# Patient Record
Sex: FEMALE | Race: WHITE
[De-identification: names, ages, dates, MRNs, and addresses within clinical notes are randomized per-mention and may not be internally consistent; named-entity substitution may affect disease eponyms.]

---

## 2020-10-15 ENCOUNTER — HOSPITAL ENCOUNTER (OUTPATIENT)
Dept: HOSPITAL 8 - RAD | Age: 40
Discharge: HOME | End: 2020-10-15
Attending: FAMILY MEDICINE
Payer: SELF-PAY

## 2020-10-15 DIAGNOSIS — N88.8: Primary | ICD-10-CM

## 2020-10-15 PROCEDURE — 76856 US EXAM PELVIC COMPLETE: CPT

## 2021-12-21 ENCOUNTER — OFFICE VISIT (OUTPATIENT)
Dept: URGENT CARE | Facility: CLINIC | Age: 41
End: 2021-12-21
Payer: MEDICAID

## 2021-12-21 VITALS
HEART RATE: 88 BPM | HEIGHT: 66 IN | BODY MASS INDEX: 26.36 KG/M2 | SYSTOLIC BLOOD PRESSURE: 118 MMHG | RESPIRATION RATE: 16 BRPM | WEIGHT: 164 LBS | DIASTOLIC BLOOD PRESSURE: 84 MMHG | TEMPERATURE: 98 F | OXYGEN SATURATION: 98 %

## 2021-12-21 DIAGNOSIS — L30.9 ECZEMA, UNSPECIFIED TYPE: ICD-10-CM

## 2021-12-21 PROCEDURE — 99213 OFFICE O/P EST LOW 20 MIN: CPT | Performed by: STUDENT IN AN ORGANIZED HEALTH CARE EDUCATION/TRAINING PROGRAM

## 2021-12-21 RX ORDER — METHYLPREDNISOLONE 4 MG/1
TABLET ORAL
Qty: 21 TABLET | Refills: 0 | Status: SHIPPED | OUTPATIENT
Start: 2021-12-21 | End: 2022-03-13

## 2021-12-21 RX ORDER — BETAMETHASONE DIPROPIONATE 0.5 MG/G
0.5 CREAM TOPICAL 2 TIMES DAILY
Qty: 45 G | Refills: 1 | Status: SHIPPED | OUTPATIENT
Start: 2021-12-21 | End: 2022-01-04

## 2021-12-21 RX ORDER — DEXAMETHASONE SODIUM PHOSPHATE 10 MG/ML
10 INJECTION INTRAMUSCULAR; INTRAVENOUS ONCE
Status: COMPLETED | OUTPATIENT
Start: 2021-12-21 | End: 2021-12-21

## 2021-12-21 RX ADMIN — DEXAMETHASONE SODIUM PHOSPHATE 10 MG: 10 INJECTION INTRAMUSCULAR; INTRAVENOUS at 09:46

## 2021-12-21 NOTE — PROGRESS NOTES
"Subjective     Sharita Jordan is a 41 y.o. female who presents with Eczema (flare up for the past month located on face and underarms)            Patient is a very agreeable 41-year-old  individual with a significant history of eczema in the past patient recently moved to the area has been trying to establish with dermatology however has been unsuccessful.  Presently patient endorses rash on face in addition to axillary areas.  Otherwise denies subjective fevers rigors chills denies any nausea vomiting.  Patient tried hydrocortisone in the past with some relief although now to stop working.      Review of Systems   Skin: Positive for rash.   All other systems reviewed and are negative.             Objective     /84 (BP Location: Left arm, Patient Position: Sitting, BP Cuff Size: Adult long)   Pulse 88   Temp 36.7 °C (98 °F) (Temporal)   Resp 16   Ht 1.676 m (5' 6\")   Wt 74.4 kg (164 lb)   SpO2 98%   Breastfeeding No   BMI 26.47 kg/m²      Physical Exam  Skin:     Findings: Rash present.      Comments: Rash involving cheeks in addition axillary areas no evidence of maculopapular areas                             Assessment & Plan        1. Eczema, unspecified type  Patient with history of eczema presents clinic for further management.  Plan:  1.  Decadron 10 mg IM x1 dose  2.  Solu-Medrol Dosepak to be taken as prescribed  3.  Betamethasone as prescribed below to be taken as prescribed below  4.  Referral to dermatology.    Patient was counseled that should his symptoms not improve or suddenly worsen to contact urgent care for further medication of adjustment.  Patient endorses understanding.  - dexamethasone (DECADRON) injection (check route below) 10 mg  - methylPREDNISolone (MEDROL DOSEPAK) 4 MG Tablet Therapy Pack; Follow schedule on package instructions.  Dispense: 21 Tablet; Refill: 0  - betamethasone dipropionate 0.05 % Cream; Apply 0.5 cm topically 2 times a day for 14 days.  " Dispense: 45 g; Refill: 1  - Referral to Dermatology

## 2022-03-13 ENCOUNTER — OFFICE VISIT (OUTPATIENT)
Dept: URGENT CARE | Facility: CLINIC | Age: 42
End: 2022-03-13
Payer: MEDICAID

## 2022-03-13 VITALS
WEIGHT: 161 LBS | DIASTOLIC BLOOD PRESSURE: 82 MMHG | BODY MASS INDEX: 25.88 KG/M2 | SYSTOLIC BLOOD PRESSURE: 120 MMHG | OXYGEN SATURATION: 99 % | TEMPERATURE: 99.9 F | HEART RATE: 100 BPM | HEIGHT: 66 IN | RESPIRATION RATE: 16 BRPM

## 2022-03-13 DIAGNOSIS — R21 RASH AND NONSPECIFIC SKIN ERUPTION: ICD-10-CM

## 2022-03-13 DIAGNOSIS — L30.9 DERMATITIS: ICD-10-CM

## 2022-03-13 DIAGNOSIS — Z87.2 HISTORY OF ECZEMA: ICD-10-CM

## 2022-03-13 PROCEDURE — 99214 OFFICE O/P EST MOD 30 MIN: CPT | Performed by: NURSE PRACTITIONER

## 2022-03-13 RX ORDER — ESCITALOPRAM OXALATE 10 MG/1
TABLET ORAL
COMMUNITY
Start: 2022-02-02 | End: 2022-08-12

## 2022-03-13 RX ORDER — HYDROXYZINE HYDROCHLORIDE 25 MG/1
25 TABLET, FILM COATED ORAL EVERY 6 HOURS PRN
Qty: 30 TABLET | Refills: 0 | Status: SHIPPED | OUTPATIENT
Start: 2022-03-13 | End: 2022-08-12

## 2022-03-13 RX ORDER — PREDNISONE 20 MG/1
TABLET ORAL
Qty: 15 TABLET | Refills: 0 | Status: SHIPPED | OUTPATIENT
Start: 2022-03-13 | End: 2022-08-12

## 2022-03-13 RX ORDER — CRISABOROLE 20 MG/G
OINTMENT TOPICAL
COMMUNITY
Start: 2022-03-02 | End: 2022-08-12

## 2022-03-13 RX ORDER — TRIAMCINOLONE ACETONIDE 0.25 MG/ML
LOTION TOPICAL
COMMUNITY
Start: 2022-02-14 | End: 2022-08-12

## 2022-03-13 RX ORDER — PROPRANOLOL HYDROCHLORIDE 20 MG/1
20 TABLET ORAL 2 TIMES DAILY PRN
COMMUNITY
Start: 2022-02-02 | End: 2023-03-07

## 2022-03-13 ASSESSMENT — ENCOUNTER SYMPTOMS
CONSTITUTIONAL NEGATIVE: 1
SHORTNESS OF BREATH: 0

## 2022-03-13 ASSESSMENT — VISUAL ACUITY: OU: 1

## 2022-03-13 NOTE — PROGRESS NOTES
Subjective:     Sharita Jordan is a 41 y.o. female who presents for Allergic Reaction (Face started with blistering, now swollen and cracking, now getting rashes on rest of body, possibly from new medications, she stopped them on friday)       Rash  This is a new problem. The problem has been gradually worsening since onset. Pertinent negatives include no shortness of breath.     Patient seen in urgent care 12/21/2021.  Was having flareup of eczema on face and underarms.  Was started on Medrol Dosepak, betamethasone cream, and given Decadron IM.  Referred to dermatology.    Patient reports that 2 weeks ago, she started to develop worsening rash.  2 weeks prior to the onset of her rash, she started new medications including Lexapro and propanolol.  These were started for anxiety.  Patient has since stopped the medication suspecting possible allergy.  Has history of eczema.  Has used triamcinolone lotion for her face where her symptoms usually occur.  Recently was prescribed Eucrisa, has not started this yet.  Reports rash is itchy and it burns.  Affects mainly the face, but now is having symptoms on her arms.  Has been scratching.  Denies other exposure.  Has an appointment with dermatology in May.  Tried Benadryl with no improvement in the symptoms.  Takes loratadine once a day.  No shortness of breath.    Patient was screened prior to rooming and denied COVID-19 diagnosis or contact with a person who has been diagnosed or is suspected to have COVID-19. During this visit, appropriate PPE was worn, hand hygiene was performed, and the patient and any visitors were masked.     PMH:  has no past medical history on file.    MEDS:   Current Outpatient Medications:   •  predniSONE (DELTASONE) 20 MG Tab, Take 3 tabs daily x 3 days, then 2 tabs daily x 2 days, then 1 tab x 2 days., Disp: 15 Tablet, Rfl: 0  •  hydrOXYzine HCl (ATARAX) 25 MG Tab, Take 1 Tablet by mouth every 6 hours as needed for Itching., Disp: 30 Tablet,  "Rfl: 0  •  escitalopram (LEXAPRO) 10 MG Tab, PLEASE SEE ATTACHED FOR DETAILED DIRECTIONS (Patient not taking: Reported on 3/13/2022), Disp: , Rfl:   •  propranolol (INDERAL) 20 MG Tab, Take 20 mg by mouth 2 times a day as needed. (Patient not taking: Reported on 3/13/2022), Disp: , Rfl:     ALLERGIES:   Allergies   Allergen Reactions   • Azithromycin Rash and Itching     Pt stated   • Cyclorex [Cyclandelate] Rash and Itching     Pt stated     SURGHX: History reviewed. No pertinent surgical history.    SOCHX:  reports that she has been smoking cigarettes. She has never used smokeless tobacco. She reports current alcohol use. She reports that she does not use drugs.     FH: Reviewed with patient, not pertinent to this visit.    Review of Systems   Constitutional: Negative.    Respiratory: Negative for shortness of breath.    Skin: Positive for itching and rash.   All other systems reviewed and are negative.    Additional details per HPI.      Objective:     /82   Pulse 100   Temp 37.7 °C (99.9 °F) (Temporal)   Resp 16   Ht 1.676 m (5' 6\")   Wt 73 kg (161 lb)   SpO2 99%   BMI 25.99 kg/m²     Physical Exam  Vitals reviewed.   Constitutional:       General: She is not in acute distress.     Appearance: She is well-developed. She is not ill-appearing or toxic-appearing.   Eyes:      General: Vision grossly intact.      Extraocular Movements: Extraocular movements intact.   Cardiovascular:      Rate and Rhythm: Normal rate.   Pulmonary:      Effort: Pulmonary effort is normal. No respiratory distress.   Musculoskeletal:         General: No deformity. Normal range of motion.      Cervical back: Normal range of motion.      Comments: Erythema, inflammation, dryness, scaliness of skin of face, smaller similar lesions on arms   Skin:     General: Skin is warm and dry.      Coloration: Skin is not pale.      Findings: Rash present.   Neurological:      Mental Status: She is alert and oriented to person, place, and " time.      Sensory: No sensory deficit.      Motor: No weakness.   Psychiatric:         Behavior: Behavior normal. Behavior is cooperative.       Assessment/Plan:     1. Rash and nonspecific skin eruption  - predniSONE (DELTASONE) 20 MG Tab; Take 3 tabs daily x 3 days, then 2 tabs daily x 2 days, then 1 tab x 2 days.  Dispense: 15 Tablet; Refill: 0  - hydrOXYzine HCl (ATARAX) 25 MG Tab; Take 1 Tablet by mouth every 6 hours as needed for Itching.  Dispense: 30 Tablet; Refill: 0    2. Dermatitis  - predniSONE (DELTASONE) 20 MG Tab; Take 3 tabs daily x 3 days, then 2 tabs daily x 2 days, then 1 tab x 2 days.  Dispense: 15 Tablet; Refill: 0  - hydrOXYzine HCl (ATARAX) 25 MG Tab; Take 1 Tablet by mouth every 6 hours as needed for Itching.  Dispense: 30 Tablet; Refill: 0    3. History of eczema  - predniSONE (DELTASONE) 20 MG Tab; Take 3 tabs daily x 3 days, then 2 tabs daily x 2 days, then 1 tab x 2 days.  Dispense: 15 Tablet; Refill: 0  - hydrOXYzine HCl (ATARAX) 25 MG Tab; Take 1 Tablet by mouth every 6 hours as needed for Itching.  Dispense: 30 Tablet; Refill: 0    Other orders  - escitalopram (LEXAPRO) 10 MG Tab; PLEASE SEE ATTACHED FOR DETAILED DIRECTIONS (Patient not taking: Reported on 3/13/2022)  - propranolol (INDERAL) 20 MG Tab; Take 20 mg by mouth 2 times a day as needed. (Patient not taking: Reported on 3/13/2022)    Rx as above sent electronically.    Continue facial creams as previously directed.  May use OTC colloidal oatmeal baths as needed.  Rx for prednisone for symptom relief.  Avoid NSAIDs while on steroid therapy.  Rx sent for Atarax.  Stop Benadryl.     Follow-up with dermatology in May.    Warning signs reviewed. Return precautions discussed.     Differential diagnosis, natural history, supportive care, over-the-counter symptom management per 's instructions, close monitoring, and indications for immediate follow-up discussed.     All questions answered. Patient agrees with the plan of  care.    Billing note: chronic illness with exacerbation/progression; prescription drug management

## 2022-08-12 ENCOUNTER — OFFICE VISIT (OUTPATIENT)
Dept: URGENT CARE | Facility: CLINIC | Age: 42
End: 2022-08-12
Payer: MEDICAID

## 2022-08-12 ENCOUNTER — APPOINTMENT (OUTPATIENT)
Dept: RADIOLOGY | Facility: MEDICAL CENTER | Age: 42
End: 2022-08-12
Attending: EMERGENCY MEDICINE
Payer: MEDICAID

## 2022-08-12 ENCOUNTER — HOSPITAL ENCOUNTER (EMERGENCY)
Facility: MEDICAL CENTER | Age: 42
End: 2022-08-12
Attending: EMERGENCY MEDICINE
Payer: MEDICAID

## 2022-08-12 VITALS
OXYGEN SATURATION: 93 % | DIASTOLIC BLOOD PRESSURE: 80 MMHG | WEIGHT: 167.6 LBS | BODY MASS INDEX: 26.93 KG/M2 | HEIGHT: 66 IN | RESPIRATION RATE: 16 BRPM | TEMPERATURE: 97.9 F | SYSTOLIC BLOOD PRESSURE: 122 MMHG | HEART RATE: 81 BPM

## 2022-08-12 VITALS
SYSTOLIC BLOOD PRESSURE: 152 MMHG | DIASTOLIC BLOOD PRESSURE: 89 MMHG | HEART RATE: 59 BPM | WEIGHT: 167.99 LBS | TEMPERATURE: 98 F | BODY MASS INDEX: 27 KG/M2 | RESPIRATION RATE: 16 BRPM | HEIGHT: 66 IN | OXYGEN SATURATION: 100 %

## 2022-08-12 DIAGNOSIS — R10.32 LLQ ABDOMINAL PAIN: ICD-10-CM

## 2022-08-12 DIAGNOSIS — K57.92 DIVERTICULITIS: ICD-10-CM

## 2022-08-12 LAB
ALBUMIN SERPL BCP-MCNC: 4.8 G/DL (ref 3.2–4.9)
ALBUMIN/GLOB SERPL: 1.6 G/DL
ALP SERPL-CCNC: 47 U/L (ref 30–99)
ALT SERPL-CCNC: 54 U/L (ref 2–50)
ANION GAP SERPL CALC-SCNC: 14 MMOL/L (ref 7–16)
APPEARANCE UR: CLEAR
AST SERPL-CCNC: 23 U/L (ref 12–45)
BASOPHILS # BLD AUTO: 1 % (ref 0–1.8)
BASOPHILS # BLD: 0.11 K/UL (ref 0–0.12)
BILIRUB SERPL-MCNC: 0.2 MG/DL (ref 0.1–1.5)
BILIRUB UR QL STRIP.AUTO: NEGATIVE
BUN SERPL-MCNC: 6 MG/DL (ref 8–22)
CALCIUM SERPL-MCNC: 9.8 MG/DL (ref 8.5–10.5)
CHLORIDE SERPL-SCNC: 105 MMOL/L (ref 96–112)
CO2 SERPL-SCNC: 20 MMOL/L (ref 20–33)
COLOR UR: YELLOW
CREAT SERPL-MCNC: 0.76 MG/DL (ref 0.5–1.4)
EOSINOPHIL # BLD AUTO: 0.19 K/UL (ref 0–0.51)
EOSINOPHIL NFR BLD: 1.7 % (ref 0–6.9)
ERYTHROCYTE [DISTWIDTH] IN BLOOD BY AUTOMATED COUNT: 39.7 FL (ref 35.9–50)
GFR SERPLBLD CREATININE-BSD FMLA CKD-EPI: 101 ML/MIN/1.73 M 2
GLOBULIN SER CALC-MCNC: 3 G/DL (ref 1.9–3.5)
GLUCOSE SERPL-MCNC: 86 MG/DL (ref 65–99)
GLUCOSE UR STRIP.AUTO-MCNC: NEGATIVE MG/DL
HCG SERPL QL: NEGATIVE
HCT VFR BLD AUTO: 39.6 % (ref 37–47)
HGB BLD-MCNC: 13.8 G/DL (ref 12–16)
IMM GRANULOCYTES # BLD AUTO: 0.03 K/UL (ref 0–0.11)
IMM GRANULOCYTES NFR BLD AUTO: 0.3 % (ref 0–0.9)
KETONES UR STRIP.AUTO-MCNC: NEGATIVE MG/DL
LEUKOCYTE ESTERASE UR QL STRIP.AUTO: NEGATIVE
LIPASE SERPL-CCNC: 41 U/L (ref 11–82)
LYMPHOCYTES # BLD AUTO: 2.26 K/UL (ref 1–4.8)
LYMPHOCYTES NFR BLD: 19.8 % (ref 22–41)
MCH RBC QN AUTO: 32.3 PG (ref 27–33)
MCHC RBC AUTO-ENTMCNC: 34.8 G/DL (ref 33.6–35)
MCV RBC AUTO: 92.7 FL (ref 81.4–97.8)
MICRO URNS: NORMAL
MONOCYTES # BLD AUTO: 0.84 K/UL (ref 0–0.85)
MONOCYTES NFR BLD AUTO: 7.4 % (ref 0–13.4)
NEUTROPHILS # BLD AUTO: 7.99 K/UL (ref 2–7.15)
NEUTROPHILS NFR BLD: 69.8 % (ref 44–72)
NITRITE UR QL STRIP.AUTO: NEGATIVE
NRBC # BLD AUTO: 0 K/UL
NRBC BLD-RTO: 0 /100 WBC
PH UR STRIP.AUTO: 7.5 [PH] (ref 5–8)
PLATELET # BLD AUTO: 297 K/UL (ref 164–446)
PMV BLD AUTO: 10.5 FL (ref 9–12.9)
POTASSIUM SERPL-SCNC: 3.9 MMOL/L (ref 3.6–5.5)
PROT SERPL-MCNC: 7.8 G/DL (ref 6–8.2)
PROT UR QL STRIP: NEGATIVE MG/DL
RBC # BLD AUTO: 4.27 M/UL (ref 4.2–5.4)
RBC UR QL AUTO: NEGATIVE
SODIUM SERPL-SCNC: 139 MMOL/L (ref 135–145)
SP GR UR STRIP.AUTO: 1
UROBILINOGEN UR STRIP.AUTO-MCNC: 0.2 MG/DL
WBC # BLD AUTO: 11.4 K/UL (ref 4.8–10.8)

## 2022-08-12 PROCEDURE — 36415 COLL VENOUS BLD VENIPUNCTURE: CPT

## 2022-08-12 PROCEDURE — 80053 COMPREHEN METABOLIC PANEL: CPT

## 2022-08-12 PROCEDURE — 96376 TX/PRO/DX INJ SAME DRUG ADON: CPT

## 2022-08-12 PROCEDURE — 94760 N-INVAS EAR/PLS OXIMETRY 1: CPT

## 2022-08-12 PROCEDURE — 700117 HCHG RX CONTRAST REV CODE 255: Performed by: EMERGENCY MEDICINE

## 2022-08-12 PROCEDURE — 700102 HCHG RX REV CODE 250 W/ 637 OVERRIDE(OP): Performed by: EMERGENCY MEDICINE

## 2022-08-12 PROCEDURE — A9270 NON-COVERED ITEM OR SERVICE: HCPCS | Performed by: EMERGENCY MEDICINE

## 2022-08-12 PROCEDURE — 96374 THER/PROPH/DIAG INJ IV PUSH: CPT

## 2022-08-12 PROCEDURE — 85025 COMPLETE CBC W/AUTO DIFF WBC: CPT

## 2022-08-12 PROCEDURE — 81003 URINALYSIS AUTO W/O SCOPE: CPT

## 2022-08-12 PROCEDURE — 83690 ASSAY OF LIPASE: CPT

## 2022-08-12 PROCEDURE — 84703 CHORIONIC GONADOTROPIN ASSAY: CPT

## 2022-08-12 PROCEDURE — 700111 HCHG RX REV CODE 636 W/ 250 OVERRIDE (IP): Performed by: EMERGENCY MEDICINE

## 2022-08-12 PROCEDURE — 74177 CT ABD & PELVIS W/CONTRAST: CPT

## 2022-08-12 PROCEDURE — 99285 EMERGENCY DEPT VISIT HI MDM: CPT

## 2022-08-12 PROCEDURE — 99215 OFFICE O/P EST HI 40 MIN: CPT | Performed by: PHYSICIAN ASSISTANT

## 2022-08-12 PROCEDURE — 96375 TX/PRO/DX INJ NEW DRUG ADDON: CPT

## 2022-08-12 RX ORDER — PROPRANOLOL HYDROCHLORIDE 10 MG/1
TABLET ORAL
COMMUNITY
Start: 2022-07-29

## 2022-08-12 RX ORDER — OXYCODONE HYDROCHLORIDE 5 MG/1
5 TABLET ORAL EVERY 4 HOURS PRN
Qty: 18 TABLET | Refills: 0 | Status: SHIPPED | OUTPATIENT
Start: 2022-08-12 | End: 2022-08-15

## 2022-08-12 RX ORDER — TRAZODONE HYDROCHLORIDE 50 MG/1
TABLET ORAL
COMMUNITY
Start: 2022-07-29 | End: 2023-03-07

## 2022-08-12 RX ORDER — OXYCODONE HYDROCHLORIDE 5 MG/1
5 TABLET ORAL EVERY 4 HOURS PRN
Qty: 18 TABLET | Refills: 0 | Status: SHIPPED | OUTPATIENT
Start: 2022-08-12 | End: 2022-08-12 | Stop reason: SDUPTHER

## 2022-08-12 RX ORDER — ONDANSETRON 4 MG/1
4 TABLET, ORALLY DISINTEGRATING ORAL EVERY 8 HOURS PRN
Qty: 20 TABLET | Refills: 0 | Status: SHIPPED | OUTPATIENT
Start: 2022-08-12 | End: 2023-03-07

## 2022-08-12 RX ORDER — HYDROMORPHONE HYDROCHLORIDE 1 MG/ML
0.5 INJECTION, SOLUTION INTRAMUSCULAR; INTRAVENOUS; SUBCUTANEOUS
Status: DISCONTINUED | OUTPATIENT
Start: 2022-08-12 | End: 2022-08-12 | Stop reason: HOSPADM

## 2022-08-12 RX ORDER — ONDANSETRON 4 MG/1
4 TABLET, ORALLY DISINTEGRATING ORAL EVERY 8 HOURS PRN
Qty: 20 TABLET | Refills: 0 | Status: SHIPPED | OUTPATIENT
Start: 2022-08-12 | End: 2022-08-12 | Stop reason: SDUPTHER

## 2022-08-12 RX ORDER — AMOXICILLIN AND CLAVULANATE POTASSIUM 875; 125 MG/1; MG/1
1 TABLET, FILM COATED ORAL ONCE
Status: COMPLETED | OUTPATIENT
Start: 2022-08-12 | End: 2022-08-12

## 2022-08-12 RX ORDER — BUPROPION HYDROCHLORIDE 150 MG/1
TABLET ORAL
COMMUNITY
Start: 2022-08-05

## 2022-08-12 RX ORDER — OXYCODONE HYDROCHLORIDE 5 MG/1
5 TABLET ORAL ONCE
Status: COMPLETED | OUTPATIENT
Start: 2022-08-12 | End: 2022-08-12

## 2022-08-12 RX ORDER — AMOXICILLIN AND CLAVULANATE POTASSIUM 875; 125 MG/1; MG/1
1 TABLET, FILM COATED ORAL 2 TIMES DAILY
Qty: 14 TABLET | Refills: 0 | Status: SHIPPED | OUTPATIENT
Start: 2022-08-12 | End: 2022-08-12 | Stop reason: SDUPTHER

## 2022-08-12 RX ORDER — ONDANSETRON 2 MG/ML
4 INJECTION INTRAMUSCULAR; INTRAVENOUS ONCE
Status: COMPLETED | OUTPATIENT
Start: 2022-08-12 | End: 2022-08-12

## 2022-08-12 RX ORDER — AMOXICILLIN AND CLAVULANATE POTASSIUM 875; 125 MG/1; MG/1
1 TABLET, FILM COATED ORAL 2 TIMES DAILY
Qty: 14 TABLET | Refills: 0 | Status: SHIPPED | OUTPATIENT
Start: 2022-08-12 | End: 2022-08-19

## 2022-08-12 RX ADMIN — HYDROMORPHONE HYDROCHLORIDE 0.5 MG: 1 INJECTION, SOLUTION INTRAMUSCULAR; INTRAVENOUS; SUBCUTANEOUS at 18:35

## 2022-08-12 RX ADMIN — OXYCODONE 5 MG: 5 TABLET ORAL at 18:57

## 2022-08-12 RX ADMIN — IOHEXOL 90 ML: 350 INJECTION, SOLUTION INTRAVENOUS at 18:45

## 2022-08-12 RX ADMIN — HYDROMORPHONE HYDROCHLORIDE 0.5 MG: 1 INJECTION, SOLUTION INTRAMUSCULAR; INTRAVENOUS; SUBCUTANEOUS at 17:25

## 2022-08-12 RX ADMIN — ONDANSETRON 4 MG: 2 INJECTION INTRAMUSCULAR; INTRAVENOUS at 17:25

## 2022-08-12 RX ADMIN — AMOXICILLIN AND CLAVULANATE POTASSIUM 1 TABLET: 875; 125 TABLET, FILM COATED ORAL at 18:52

## 2022-08-12 ASSESSMENT — ENCOUNTER SYMPTOMS
RESPIRATORY NEGATIVE: 1
BLOOD IN STOOL: 0
CONSTITUTIONAL NEGATIVE: 1
NEUROLOGICAL NEGATIVE: 1
CARDIOVASCULAR NEGATIVE: 1
EYES NEGATIVE: 1
ABDOMINAL PAIN: 1
CONSTIPATION: 0
NAUSEA: 0
DIARRHEA: 0
VOMITING: 0

## 2022-08-12 ASSESSMENT — PAIN DESCRIPTION - PAIN TYPE: TYPE: ACUTE PAIN

## 2022-08-12 NOTE — PROGRESS NOTES
"  Subjective:     Sharita Jordan  is a 42 y.o. female who presents for Abdominal Pain (Lower left side x 4 days )       She presents today with left lower quadrant pain x3-4 days but did get significantly worse over the last 24 hours.  She has subjective fever and notes full body \"shakiness\".  No history of trauma to the abdomen.  She denies chest pain, shortness of breath, nausea/vomiting, diarrhea.  No changes in bowel movements, she did have 3 bowel movements today that were normal and appearance.  No blood in the stool.  No vaginal pain, vaginal bleeding, vaginal discharge.  She does still have her ovaries and uterus.  No recent illnesses.      Review of Systems   Constitutional: Negative.    HENT: Negative.     Eyes: Negative.    Respiratory: Negative.     Cardiovascular: Negative.    Gastrointestinal:  Positive for abdominal pain (Severe left lower quadrant pain). Negative for blood in stool, constipation, diarrhea, nausea and vomiting.   Genitourinary: Negative.    Neurological: Negative.     Allergies   Allergen Reactions    Cefaclor Hives    Erythromycin Hives    Erythromycin-Sulfisoxazole Hives    Azithromycin Rash and Itching     Pt stated    Cyclorex [Cyclandelate] Rash and Itching     Pt stated     No past medical history on file.     Objective:   /80   Pulse 81   Temp 36.6 °C (97.9 °F) (Temporal)   Resp 16   Ht 1.676 m (5' 6\")   Wt 76 kg (167 lb 9.6 oz)   SpO2 93%   BMI 27.05 kg/m²   Physical Exam  Vitals and nursing note reviewed.   Constitutional:       General: She is not in acute distress.     Appearance: She is not ill-appearing or toxic-appearing.   HENT:      Head: Normocephalic.      Nose: No rhinorrhea.   Eyes:      General: No scleral icterus.     Conjunctiva/sclera: Conjunctivae normal.   Cardiovascular:      Rate and Rhythm: Normal rate and regular rhythm.   Pulmonary:      Effort: Pulmonary effort is normal. No respiratory distress.      Breath sounds: No stridor. "   Abdominal:      General: Abdomen is flat. Bowel sounds are normal.      Tenderness: There is abdominal tenderness (Severe tenderness to light palpation over the left lower quadrant). There is guarding.   Musculoskeletal:      Cervical back: Neck supple.   Neurological:      Mental Status: She is alert and oriented to person, place, and time.   Psychiatric:         Mood and Affect: Mood normal.         Behavior: Behavior normal.         Thought Content: Thought content normal.         Judgment: Judgment normal.           Diagnostic testing: None    Assessment/Plan:     Encounter Diagnoses   Name Primary?    LLQ abdominal pain           Plan for care for today's complaint includes transfer to [facility] emergency department for further evaluation and management of her current symptoms.  At this time we are unable to order an outpatient CT scan within a reasonable amount of time based on her symptom presentation and symptom severity.  Transfer service was contacted.    See AVS Instructions below for written guidance provided to patient on after-visit management and care in addition to our verbal discussion during the visit.    Please note that this dictation was created using voice recognition software. I have attempted to correct all errors, but there may be sound-alike, spelling, grammar and possibly content errors that I did not discover before finalizing the note.    Jan Santos PA-C

## 2022-08-12 NOTE — ED TRIAGE NOTES
"Chief Complaint   Patient presents with    Abdominal Pain     LLQ pain radiating to L flank. Pt went to urgent care and was sent here. Pain started Sunday but worsening today. Nausea but no vomiting. Denies diarrhea. Denies dysuria.     BP (!) 125/91   Pulse 76   Temp 37.2 °C (99 °F) (Temporal)   Resp 14   Ht 1.676 m (5' 6\")   Wt 76.2 kg (167 lb 15.9 oz)   SpO2 98%   BMI 27.11 kg/m²     "

## 2022-08-13 NOTE — ED PROVIDER NOTES
ED Provider Note    Scribed for Jalen Ludwig M.D. by Gaurav Huggins. 8/12/2022  5:06 PM    Primary care provider: Pcp Pt States None  Means of arrival: Walk-In  History obtained from: Patient  History limited by: None     CHIEF COMPLAINT  Chief Complaint   Patient presents with    Abdominal Pain     LLQ pain radiating to L flank. Pt went to urgent care and was sent here. Pain started Sunday but worsening today. Nausea but no vomiting. Denies diarrhea. Denies dysuria.       HPI  Sharita Jordan is a 42 y.o. female who presents to the Emergency Department for worsening left lower quadrant and mid abdominal pain onset 4 days ago. Patient reports a history of ovarian cysts. She denies a history of abdominal surgeries or abdominal pain. Patient reports her last period was 60 days ago. She reports having mid abdominal pain for 4 days which radiated to her left lower quadrant this morning prompting her to initially present to Urgent Care who referred the patient to the ED. Patient reports associated nausea, mid abdominal pain, pain radiation to her back. There are no alleviating or exacerbating factors. She denies associated fever, vomiting, dysuria, hematuria, blood in stool, decreased bowel movements. Patient denies being pregnant.      REVIEW OF SYSTEMS  Pertinent positives include left lower quadrant abdominal pain, mid abdominal pain, nausea, mid abdominal pain, pain radiation to her back. Pertinent negatives include no fever, vomiting, dysuria, hematuria, blood in stool, decreased bowel movements.  All other systems reviewed and negative.    PAST MEDICAL HISTORY       SURGICAL HISTORY  patient denies any surgical history    SOCIAL HISTORY  Social History     Tobacco Use    Smoking status: Every Day     Types: Cigarettes    Smokeless tobacco: Never   Substance Use Topics    Alcohol use: Yes     Comment: Social    Drug use: Never      Social History     Substance and Sexual Activity   Drug Use Never       FAMILY  "HISTORY  History reviewed. No pertinent family history.    CURRENT MEDICATIONS  Home Medications       Reviewed by Martínez Ayala R.N. (Registered Nurse) on 08/12/22 at 1926  Med List Status: Partial     Medication Last Dose Status   buPROPion (WELLBUTRIN XL) 150 MG XL tablet  Active   propranolol (INDERAL) 10 MG Tab  Active   propranolol (INDERAL) 20 MG Tab  Active   sertraline (ZOLOFT) 50 MG Tab  Active   traZODone (DESYREL) 50 MG Tab  Active                    ALLERGIES  Allergies   Allergen Reactions    Cefaclor Hives    Erythromycin Hives    Erythromycin-Sulfisoxazole Hives    Azithromycin Rash and Itching     Pt stated    Cyclorex [Cyclandelate] Rash and Itching     Pt stated       PHYSICAL EXAM  VITAL SIGNS: BP (!) 125/91   Pulse 76   Temp 37.2 °C (99 °F) (Temporal)   Resp 14   Ht 1.676 m (5' 6\")   Wt 76.2 kg (167 lb 15.9 oz)   SpO2 98%   BMI 27.11 kg/m²     Constitutional: Well developed, Well nourished, moderate distress, Non-toxic appearance.   HENT: Normocephalic, Atraumatic, Bilateral external ears normal, Oropharynx moist, No oral exudates.   Eyes: PERRLA, EOMI, Conjunctiva normal, No discharge.   Neck: No tenderness, Supple, No stridor.   Lymphatic: No lymphadenopathy noted.   Cardiovascular: Normal heart rate, Normal rhythm.   Thorax & Lungs: Clear to auscultation bilaterally, No respiratory distress, No wheezing, No crackles.   Abdomen: Soft, tenderness to palpation on the left side, more pronounced on the left lower quadrant, No masses, No pulsatile masses.   Skin: Warm, Dry, No erythema, No rash.   Extremities:, No edema No cyanosis.   Musculoskeletal: No tenderness to palpation or major deformities noted.  Intact distal pulses  Neurologic: Awake, alert. Moves all extremities spontaneously.  Psychiatric: Affect normal, Judgment normal, Mood normal.     LABS  Results for orders placed or performed during the hospital encounter of 08/12/22   CBC WITH DIFFERENTIAL   Result Value Ref Range    " WBC 11.4 (H) 4.8 - 10.8 K/uL    RBC 4.27 4.20 - 5.40 M/uL    Hemoglobin 13.8 12.0 - 16.0 g/dL    Hematocrit 39.6 37.0 - 47.0 %    MCV 92.7 81.4 - 97.8 fL    MCH 32.3 27.0 - 33.0 pg    MCHC 34.8 33.6 - 35.0 g/dL    RDW 39.7 35.9 - 50.0 fL    Platelet Count 297 164 - 446 K/uL    MPV 10.5 9.0 - 12.9 fL    Neutrophils-Polys 69.80 44.00 - 72.00 %    Lymphocytes 19.80 (L) 22.00 - 41.00 %    Monocytes 7.40 0.00 - 13.40 %    Eosinophils 1.70 0.00 - 6.90 %    Basophils 1.00 0.00 - 1.80 %    Immature Granulocytes 0.30 0.00 - 0.90 %    Nucleated RBC 0.00 /100 WBC    Neutrophils (Absolute) 7.99 (H) 2.00 - 7.15 K/uL    Lymphs (Absolute) 2.26 1.00 - 4.80 K/uL    Monos (Absolute) 0.84 0.00 - 0.85 K/uL    Eos (Absolute) 0.19 0.00 - 0.51 K/uL    Baso (Absolute) 0.11 0.00 - 0.12 K/uL    Immature Granulocytes (abs) 0.03 0.00 - 0.11 K/uL    NRBC (Absolute) 0.00 K/uL   COMP METABOLIC PANEL   Result Value Ref Range    Sodium 139 135 - 145 mmol/L    Potassium 3.9 3.6 - 5.5 mmol/L    Chloride 105 96 - 112 mmol/L    Co2 20 20 - 33 mmol/L    Anion Gap 14.0 7.0 - 16.0    Glucose 86 65 - 99 mg/dL    Bun 6 (L) 8 - 22 mg/dL    Creatinine 0.76 0.50 - 1.40 mg/dL    Calcium 9.8 8.5 - 10.5 mg/dL    AST(SGOT) 23 12 - 45 U/L    ALT(SGPT) 54 (H) 2 - 50 U/L    Alkaline Phosphatase 47 30 - 99 U/L    Total Bilirubin 0.2 0.1 - 1.5 mg/dL    Albumin 4.8 3.2 - 4.9 g/dL    Total Protein 7.8 6.0 - 8.2 g/dL    Globulin 3.0 1.9 - 3.5 g/dL    A-G Ratio 1.6 g/dL   LIPASE   Result Value Ref Range    Lipase 41 11 - 82 U/L   HCG QUAL SERUM   Result Value Ref Range    Beta-Hcg Qualitative Serum Negative Negative   URINALYSIS,CULTURE IF INDICATED    Specimen: Urine   Result Value Ref Range    Color Yellow     Character Clear     Specific Gravity 1.003 <1.035    Ph 7.5 5.0 - 8.0    Glucose Negative Negative mg/dL    Ketones Negative Negative mg/dL    Protein Negative Negative mg/dL    Bilirubin Negative Negative    Urobilinogen, Urine 0.2 Negative    Nitrite Negative  Negative    Leukocyte Esterase Negative Negative    Occult Blood Negative Negative    Micro Urine Req see below    ESTIMATED GFR   Result Value Ref Range    GFR (CKD-EPI) 101 >60 mL/min/1.73 m 2        RADIOLOGY  CT-ABDOMEN-PELVIS WITH   Final Result      1.  There are findings consistent with mild descending colon diverticulitis.   2.  There is mild hepatic steatosis.        The radiologist's interpretation of all radiological studies have been reviewed by me.      COURSE & MEDICAL DECISION MAKING  Pertinent Labs & Imaging studies reviewed. (See chart for details)    I reviewed the patient's medical records which showed she was sent by Urgent Care today.     5:06 PM - Patient seen and examined at bedside. Patient will be treated with Dilaudid 0.5 mg, Zofran 4 mg. Ordered UA Culture if indicated, HCG Qual Serum, Lipase, CMP, CBC w/diff, Estimated GFR, CT-Abdomen Pelvis with to evaluate her symptoms. The differential diagnoses include but are not limited to: kidney stone vs diverticulitis vs intra abdominal infection. Discussed plan of care with patient. I informed them that labs and imaging will be ordered to evaluate symptoms. Patient is understanding and agreeable with plan.      6:46 PM - Patient treated with Augmentin 875-125 mg.     6:47 PM - Patient was reevaluated at bedside. They are afebrile, patient has some asymptomatic hypertension, vitals otherwise normal. Informed patient of finding of diverticulitis. Further informed patient of plan to provide medication prescription for pain. Advised patient to follow up with their primary care physician and GI in an outpatient setting. I then informed the patient of my plan for discharge, which includes strict return precautions for any new or worsening symptoms. She understands and verbalizes agreement to plan of care. They were given an opportunity to ask questions. She is comfortable going home at this time.      6:49 PM - Patient treated with Roxicodone 5  mg    Decision Making:  Patient with moderate severe left-sided flank pain/abdominal pain of uncertain etiology.  The patient has significant tenderness on the left side of the abdomen, CT scan shows mild diverticulitis.  Will discharge patient home on Augmentin for 7 days, give the patient a prescription for some pain medicines, Zofran, have the patient return in the next 1 to 2 days if not improved, return sooner with worsening symptoms.    I reviewed prescription monitoring program for patient's narcotic use before prescribing a scheduled drug.The patient will not drink alcohol nor drive with prescribed medications. The patient will return for new or worsening symptoms and is stable at the time of discharge.    The patient is referred to a primary physician for blood pressure management, diabetic screening, and for all other preventative health concerns.    In prescribing controlled substances to this patient, I certify that I have obtained and reviewed the medical history of Sharita Jordan. I have also made a good jt effort to obtain applicable records from other providers who have treated the patient and records did not demonstrate any increased risk of substance abuse that would prevent me from prescribing controlled substances.     I have conducted a physical exam and documented it. I have reviewed Ms. Jordan’s prescription history as maintained by the [place] Prescription Monitoring Program.     I have assessed the patient’s risk for abuse, dependency, and addiction using the validated Opioid Risk Tool available at https://www.mdcalc.com/xifbhf-vtky-nlmm-ort-narcotic-abuse.     Given the above, I believe the benefits of controlled substance therapy outweigh the risks. The reasons for prescribing controlled substances include non-narcotic, oral analgesic alternatives have been inadequate for pain control. Accordingly, I have discussed the risk and benefits, treatment plan, and alternative therapies with the  patient.      DISPOSITION:  Patient will be discharged home in stable condition.    FOLLOW UP:  No follow-up provider specified.    OUTPATIENT MEDICATIONS:  Discharge Medication List as of 8/12/2022  7:38 PM      Augmentin, Percocet, Zofran    FINAL IMPRESSION  1. Diverticulitis          Gaurav JC (Scribe), am scribing for, and in the presence of, Jalen Ludwig M.D..    Electronically signed by: Gaurav Huggins (Scribe), 8/12/2022    Jalen JC M.D. personally performed the services described in this documentation, as scribed by Gaurav Huggins in my presence, and it is both accurate and complete.    The note accurately reflects work and decisions made by me.  Jalen Ludwig M.D.  8/12/2022  11:28 PM

## 2022-12-16 ENCOUNTER — HOSPITAL ENCOUNTER (OUTPATIENT)
Dept: RADIOLOGY | Facility: MEDICAL CENTER | Age: 42
End: 2022-12-16
Attending: NURSE PRACTITIONER
Payer: MEDICAID

## 2022-12-16 ENCOUNTER — OFFICE VISIT (OUTPATIENT)
Dept: URGENT CARE | Facility: CLINIC | Age: 42
End: 2022-12-16
Payer: MEDICAID

## 2022-12-16 ENCOUNTER — HOSPITAL ENCOUNTER (OUTPATIENT)
Dept: LAB | Facility: MEDICAL CENTER | Age: 42
End: 2022-12-16
Attending: NURSE PRACTITIONER
Payer: MEDICAID

## 2022-12-16 VITALS
WEIGHT: 164 LBS | OXYGEN SATURATION: 97 % | TEMPERATURE: 97.8 F | HEIGHT: 66 IN | RESPIRATION RATE: 16 BRPM | DIASTOLIC BLOOD PRESSURE: 60 MMHG | BODY MASS INDEX: 26.36 KG/M2 | SYSTOLIC BLOOD PRESSURE: 110 MMHG | HEART RATE: 93 BPM

## 2022-12-16 DIAGNOSIS — R10.31 RLQ ABDOMINAL PAIN: ICD-10-CM

## 2022-12-16 DIAGNOSIS — K63.89 EPIPLOIC APPENDAGITIS: ICD-10-CM

## 2022-12-16 LAB
ALBUMIN SERPL BCP-MCNC: 4.9 G/DL (ref 3.2–4.9)
ALBUMIN/GLOB SERPL: 1.9 G/DL
ALP SERPL-CCNC: 43 U/L (ref 30–99)
ALT SERPL-CCNC: 31 U/L (ref 2–50)
ANION GAP SERPL CALC-SCNC: 10 MMOL/L (ref 7–16)
AST SERPL-CCNC: 13 U/L (ref 12–45)
BASOPHILS # BLD AUTO: 0.4 % (ref 0–1.8)
BASOPHILS # BLD: 0.06 K/UL (ref 0–0.12)
BILIRUB SERPL-MCNC: 0.4 MG/DL (ref 0.1–1.5)
BUN SERPL-MCNC: 6 MG/DL (ref 8–22)
CALCIUM ALBUM COR SERPL-MCNC: 8.8 MG/DL (ref 8.5–10.5)
CALCIUM SERPL-MCNC: 9.5 MG/DL (ref 8.5–10.5)
CHLORIDE SERPL-SCNC: 98 MMOL/L (ref 96–112)
CO2 SERPL-SCNC: 27 MMOL/L (ref 20–33)
CREAT SERPL-MCNC: 0.66 MG/DL (ref 0.5–1.4)
EOSINOPHIL # BLD AUTO: 0.07 K/UL (ref 0–0.51)
EOSINOPHIL NFR BLD: 0.5 % (ref 0–6.9)
ERYTHROCYTE [DISTWIDTH] IN BLOOD BY AUTOMATED COUNT: 42.1 FL (ref 35.9–50)
GFR SERPLBLD CREATININE-BSD FMLA CKD-EPI: 113 ML/MIN/1.73 M 2
GLOBULIN SER CALC-MCNC: 2.6 G/DL (ref 1.9–3.5)
GLUCOSE SERPL-MCNC: 93 MG/DL (ref 65–99)
HCT VFR BLD AUTO: 41.1 % (ref 37–47)
HGB BLD-MCNC: 13.7 G/DL (ref 12–16)
IMM GRANULOCYTES # BLD AUTO: 0.05 K/UL (ref 0–0.11)
IMM GRANULOCYTES NFR BLD AUTO: 0.4 % (ref 0–0.9)
LYMPHOCYTES # BLD AUTO: 2.19 K/UL (ref 1–4.8)
LYMPHOCYTES NFR BLD: 15.9 % (ref 22–41)
MCH RBC QN AUTO: 30.6 PG (ref 27–33)
MCHC RBC AUTO-ENTMCNC: 33.3 G/DL (ref 33.6–35)
MCV RBC AUTO: 91.7 FL (ref 81.4–97.8)
MONOCYTES # BLD AUTO: 0.77 K/UL (ref 0–0.85)
MONOCYTES NFR BLD AUTO: 5.6 % (ref 0–13.4)
NEUTROPHILS # BLD AUTO: 10.6 K/UL (ref 2–7.15)
NEUTROPHILS NFR BLD: 77.2 % (ref 44–72)
NRBC # BLD AUTO: 0 K/UL
NRBC BLD-RTO: 0 /100 WBC
PLATELET # BLD AUTO: 240 K/UL (ref 164–446)
PMV BLD AUTO: 10.3 FL (ref 9–12.9)
POTASSIUM SERPL-SCNC: 3.8 MMOL/L (ref 3.6–5.5)
PROT SERPL-MCNC: 7.5 G/DL (ref 6–8.2)
RBC # BLD AUTO: 4.48 M/UL (ref 4.2–5.4)
SODIUM SERPL-SCNC: 135 MMOL/L (ref 135–145)
WBC # BLD AUTO: 13.7 K/UL (ref 4.8–10.8)

## 2022-12-16 PROCEDURE — 99214 OFFICE O/P EST MOD 30 MIN: CPT | Performed by: NURSE PRACTITIONER

## 2022-12-16 PROCEDURE — 74177 CT ABD & PELVIS W/CONTRAST: CPT

## 2022-12-16 PROCEDURE — 700117 HCHG RX CONTRAST REV CODE 255: Performed by: NURSE PRACTITIONER

## 2022-12-16 PROCEDURE — 36415 COLL VENOUS BLD VENIPUNCTURE: CPT

## 2022-12-16 PROCEDURE — 80053 COMPREHEN METABOLIC PANEL: CPT

## 2022-12-16 PROCEDURE — 85025 COMPLETE CBC W/AUTO DIFF WBC: CPT

## 2022-12-16 RX ORDER — ONDANSETRON 4 MG/1
4 TABLET, ORALLY DISINTEGRATING ORAL ONCE
Status: COMPLETED | OUTPATIENT
Start: 2022-12-16 | End: 2022-12-16

## 2022-12-16 RX ORDER — ONDANSETRON 4 MG/1
4 TABLET, ORALLY DISINTEGRATING ORAL EVERY 6 HOURS PRN
Qty: 15 TABLET | Refills: 0 | Status: SHIPPED | OUTPATIENT
Start: 2022-12-16 | End: 2023-03-07

## 2022-12-16 RX ORDER — AMOXICILLIN AND CLAVULANATE POTASSIUM 875; 125 MG/1; MG/1
1 TABLET, FILM COATED ORAL 2 TIMES DAILY
Qty: 20 TABLET | Refills: 0 | Status: SHIPPED | OUTPATIENT
Start: 2022-12-16 | End: 2022-12-26

## 2022-12-16 RX ADMIN — ONDANSETRON 4 MG: 4 TABLET, ORALLY DISINTEGRATING ORAL at 11:45

## 2022-12-16 RX ADMIN — IOHEXOL 100 ML: 350 INJECTION, SOLUTION INTRAVENOUS at 19:00

## 2022-12-16 ASSESSMENT — FIBROSIS 4 INDEX: FIB4 SCORE: 0.42

## 2022-12-16 NOTE — PROGRESS NOTES
Patient has consented to treatment and for use of patient information for treatment and billing purposes.    Date: 12/16/22     Arrival Mode: Private Vehicle    Chief Complaint:    Chief Complaint   Patient presents with    Abdominal Pain     Abd pain for the last week         History of Present Illness: 42 y.o.  female presents to clinic with 1 week history of worsening abdominal pain.  States she does have a history of diverticulitis which was a recent diagnosis approximately 4 months ago.  States while the pain does not feel similar and did start a moderately most significant pain at the right lower quadrant.  Last bowel movement was today denies any bloody or black diarrhea.  Its to nausea no vomiting.  No fevers and or body aches.      ROS:    As stated in HPI     Pertinent Medical History:  No past medical history on file.     Pertinent Surgical History:  No past surgical history on file.     Pertinent Medications:    Current Outpatient Medications on File Prior to Visit   Medication Sig Dispense Refill    buPROPion (WELLBUTRIN XL) 150 MG XL tablet       sertraline (ZOLOFT) 50 MG Tab       traZODone (DESYREL) 50 MG Tab  (Patient not taking: Reported on 12/16/2022)      propranolol (INDERAL) 10 MG Tab  (Patient not taking: Reported on 12/16/2022)      ondansetron (ZOFRAN ODT) 4 MG TABLET DISPERSIBLE Take 1 Tablet by mouth every 8 hours as needed for Nausea. (Patient not taking: Reported on 12/16/2022) 20 Tablet 0    propranolol (INDERAL) 20 MG Tab Take 20 mg by mouth 2 times a day as needed. (Patient not taking: Reported on 12/16/2022)       No current facility-administered medications on file prior to visit.        Allergies:    Cefaclor, Erythromycin, Erythromycin-sulfisoxazole, Azithromycin, and Cyclorex [cyclandelate]     Social History:  Social History     Tobacco Use    Smoking status: Every Day     Types: Cigarettes    Smokeless tobacco: Never   Vaping Use    Vaping Use: Never used   Substance Use Topics     Alcohol use: Not Currently     Comment: Social    Drug use: Never        No LMP recorded. Patient is premenopausal.           Physical Exam:    Vitals:    12/16/22 1112   BP: 110/60   Pulse: 93   Resp: 16   Temp: 36.6 °C (97.8 °F)   SpO2: 97%             Physical Exam  Constitutional:       General: She is not in acute distress.     Appearance: Normal appearance. She is not ill-appearing or toxic-appearing.   HENT:      Head: Normocephalic and atraumatic.   Cardiovascular:      Rate and Rhythm: Normal rate and regular rhythm.      Heart sounds: Normal heart sounds.   Pulmonary:      Effort: Pulmonary effort is normal.      Breath sounds: Normal breath sounds and air entry.   Abdominal:      General: Abdomen is flat. Bowel sounds are normal.      Palpations: Abdomen is soft. There is no fluid wave, hepatomegaly or splenomegaly.      Tenderness: There is abdominal tenderness in the right upper quadrant, right lower quadrant, periumbilical area and left lower quadrant. There is rebound. There is no right CVA tenderness, left CVA tenderness or guarding. Positive signs include Rovsing's sign.      Hernia: No hernia is present.   Skin:     General: Skin is warm.      Capillary Refill: Capillary refill takes less than 2 seconds.      Coloration: Skin is not cyanotic or pale.   Neurological:      Mental Status: She is alert and oriented to person, place, and time.      Gait: Gait is intact.   Psychiatric:         Behavior: Behavior normal. Behavior is cooperative.                Diagnostics:    Recent Results (from the past 24 hour(s))   CBC WITH DIFFERENTIAL    Collection Time: 12/16/22 12:31 PM   Result Value Ref Range    WBC 13.7 (H) 4.8 - 10.8 K/uL    RBC 4.48 4.20 - 5.40 M/uL    Hemoglobin 13.7 12.0 - 16.0 g/dL    Hematocrit 41.1 37.0 - 47.0 %    MCV 91.7 81.4 - 97.8 fL    MCH 30.6 27.0 - 33.0 pg    MCHC 33.3 (L) 33.6 - 35.0 g/dL    RDW 42.1 35.9 - 50.0 fL    Platelet Count 240 164 - 446 K/uL    MPV 10.3 9.0 - 12.9  fL    Neutrophils-Polys 77.20 (H) 44.00 - 72.00 %    Lymphocytes 15.90 (L) 22.00 - 41.00 %    Monocytes 5.60 0.00 - 13.40 %    Eosinophils 0.50 0.00 - 6.90 %    Basophils 0.40 0.00 - 1.80 %    Immature Granulocytes 0.40 0.00 - 0.90 %    Nucleated RBC 0.00 /100 WBC    Neutrophils (Absolute) 10.60 (H) 2.00 - 7.15 K/uL    Lymphs (Absolute) 2.19 1.00 - 4.80 K/uL    Monos (Absolute) 0.77 0.00 - 0.85 K/uL    Eos (Absolute) 0.07 0.00 - 0.51 K/uL    Baso (Absolute) 0.06 0.00 - 0.12 K/uL    Immature Granulocytes (abs) 0.05 0.00 - 0.11 K/uL    NRBC (Absolute) 0.00 K/uL   Comp Metabolic Panel    Collection Time: 12/16/22 12:31 PM   Result Value Ref Range    Sodium 135 135 - 145 mmol/L    Potassium 3.8 3.6 - 5.5 mmol/L    Chloride 98 96 - 112 mmol/L    Co2 27 20 - 33 mmol/L    Anion Gap 10.0 7.0 - 16.0    Glucose 93 65 - 99 mg/dL    Bun 6 (L) 8 - 22 mg/dL    Creatinine 0.66 0.50 - 1.40 mg/dL    Calcium 9.5 8.5 - 10.5 mg/dL    AST(SGOT) 13 12 - 45 U/L    ALT(SGPT) 31 2 - 50 U/L    Alkaline Phosphatase 43 30 - 99 U/L    Total Bilirubin 0.4 0.1 - 1.5 mg/dL    Albumin 4.9 3.2 - 4.9 g/dL    Total Protein 7.5 6.0 - 8.2 g/dL    Globulin 2.6 1.9 - 3.5 g/dL    A-G Ratio 1.9 g/dL   CORRECTED CALCIUM    Collection Time: 12/16/22 12:31 PM   Result Value Ref Range    Correct Calcium 8.8 8.5 - 10.5 mg/dL   ESTIMATED GFR    Collection Time: 12/16/22 12:31 PM   Result Value Ref Range    GFR (CKD-EPI) 113 >60 mL/min/1.73 m 2      CT-ABDOMEN-PELVIS WITH    Result Date: 12/16/2022 12/16/2022 6:08 PM HISTORY/REASON FOR EXAM:  Right lower quadrant abdominal pain. TECHNIQUE/EXAM DESCRIPTION:   CT scan of the abdomen and pelvis with contrast. Contrast-enhanced helical scanning was obtained from the diaphragmatic domes through the pubic symphysis following the bolus administration of nonionic contrast without complication. 100 mL of Omnipaque 350 nonionic contrast was administered without complication. Low dose optimization technique was utilized for  this CT exam including automated exposure control and adjustment of the mA and/or kV according to patient size. COMPARISON: 8/12/2022. FINDINGS: Lower chest: Lung bases are clear. Liver: Hepatic steatosis. No mass. No intrahepatic biliary dilatation. Gallbladder: No mural thickening. No calcified gallstones. Common bile duct: Nondilated. Pancreas: Unremarkable. Spleen: No mass. Adrenals: No mass. Kidneys: No suspicious mass lesions. Tiny right renal cyst, which does not require imaging follow-up. No hydronephrosis. Stomach, small bowel, colon: Marked inflammatory changes adjacent to a short segment of ascending colon, suggestive of epiploic appendagitis. Distal colonic diverticulosis. Normal appendix. Peritoneal cavity: No ascites. Lymph nodes: No enlarged nodes by size criteria. Aorta: No aneurysm. Pelvic organs: Unremarkable. Musculoskeletal structures: No acute fracture or destructive lesion.     1.  Epiploic appendagitis of the ascending colon. No abscess formation. 2.  Distal colonic diverticulosis. 3.  Normal appendix.                Medical Decision making and clinic course :  I personally reviewed prior external notes and test results pertinent to today's visit.   Shared decision-making was utilized with patient for treatment plan.     11:30  Pleasant 42-year-old female presenting to clinic with 1 week of worsening abdominal pain.  Patient does have a history of diverticulitis of which the patient states the pain does feel similar although it is on her right lower abdomen.  Patient appears generally well on exam, nontoxic vital signs are stable.  Patient is appropriate for outpatient work-up.  In clinic Zofran for nausea this is worked well for the patient in the past.  Will obtain lab work as well as CT abdomen pelvis.  Discussed plan of care with patient strict go to ER guidelines were discussed while awaiting the CT.  Discussed plan of care with patient she is agreeable.    The patient remained stable  during the urgent care visit.    6:30 called and discussed lab findings as well as CT findings with patient.  Patient does have an elevated white blood cell count with a left shift.  CT scanning reveals epiploic appendagitis.  Did discuss with patient that this condition can slightly elevate the white blood cells although due to the left shift of neutrophils do have concern for infection.  We will send for Augmentin at this time during abundance of caution.  Did discuss treatment for epiploic is conservative.  Recommended ibuprofen 400 mg every 6-8 hours as needed.  We will send additional Zofran to the pharmacy.  Fortunately patient does have a follow-up with gastroenterology in 3 days time.     Did discuss strict go to ER guidelines.  If symptoms change or worsen in any way she is to seek higher level care.    Plan:    Medication discussed included indication for use and the potential benefits and side effects.    Administrations This Visit       ondansetron (ZOFRAN ODT) dispertab 4 mg       Admin Date  12/16/2022 Action  Given Dose  4 mg Route  Oral Administered By  Dora Plasencia, EMT-Basic                     1. RLQ abdominal pain    - ondansetron (ZOFRAN ODT) dispertab 4 mg  - CT-ABDOMEN-PELVIS WITH; Future  - CBC WITH DIFFERENTIAL  - Comp Metabolic Panel  - ESTIMATED GFR  - ondansetron (ZOFRAN ODT) 4 MG TABLET DISPERSIBLE; Take 1 Tablet by mouth every 6 hours as needed for Nausea/Vomiting for up to 15 doses.  Dispense: 15 Tablet; Refill: 0  - amoxicillin-clavulanate (AUGMENTIN) 875-125 MG Tab; Take 1 Tablet by mouth 2 times a day for 10 days.  Dispense: 20 Tablet; Refill: 0    2. Epiploic appendagitis  -nsaids        Printed education was provided regarding the aforementioned assessments.  All of the patient's questions were answered to their satisfaction at the time of discharge.    Follow up:    Patient was encouraged to monitor symptoms closely. Those signs and symptoms which would warrant concern and  mandate seeking a higher level of service through the emergency department discussed at length and included in discharge papers.  Patient stated agreement and understanding of this plan of care.    Disposition:  Home in stable condition       Voice Recognition Disclaimer:  Portions of this document were created using voice recognition software. The software does have a chance of producing errors of grammar and possibly content. I have made every reasonable attempt to correct obvious errors, but there may be errors of grammar and possibly content that I did not discover before finalizing the documentation.    [email or web address].

## 2023-01-09 ENCOUNTER — HOSPITAL ENCOUNTER (OUTPATIENT)
Dept: LAB | Facility: MEDICAL CENTER | Age: 43
End: 2023-01-09
Attending: PHYSICIAN ASSISTANT
Payer: MEDICAID

## 2023-01-09 LAB
FERRITIN SERPL-MCNC: 81.1 NG/ML (ref 10–291)
IRON SATN MFR SERPL: 58 % (ref 15–55)
IRON SERPL-MCNC: 148 UG/DL (ref 40–170)
TIBC SERPL-MCNC: 254 UG/DL (ref 250–450)
UIBC SERPL-MCNC: 106 UG/DL (ref 110–370)

## 2023-01-09 PROCEDURE — 36415 COLL VENOUS BLD VENIPUNCTURE: CPT

## 2023-01-09 PROCEDURE — 82784 ASSAY IGA/IGD/IGG/IGM EACH: CPT

## 2023-01-09 PROCEDURE — 83550 IRON BINDING TEST: CPT

## 2023-01-09 PROCEDURE — 82728 ASSAY OF FERRITIN: CPT

## 2023-01-09 PROCEDURE — 83540 ASSAY OF IRON: CPT

## 2023-01-09 PROCEDURE — 86364 TISS TRNSGLTMNASE EA IG CLAS: CPT

## 2023-01-10 LAB
IGA SERPL-MCNC: 227 MG/DL (ref 68–408)
TTG IGA SER IA-ACNC: 2 U/ML (ref 0–3)

## 2023-02-19 ENCOUNTER — APPOINTMENT (OUTPATIENT)
Dept: URGENT CARE | Facility: CLINIC | Age: 43
End: 2023-02-19
Payer: MEDICAID

## 2023-03-03 SDOH — ECONOMIC STABILITY: TRANSPORTATION INSECURITY
IN THE PAST 12 MONTHS, HAS LACK OF TRANSPORTATION KEPT YOU FROM MEETINGS, WORK, OR FROM GETTING THINGS NEEDED FOR DAILY LIVING?: NO

## 2023-03-03 SDOH — ECONOMIC STABILITY: TRANSPORTATION INSECURITY
IN THE PAST 12 MONTHS, HAS LACK OF RELIABLE TRANSPORTATION KEPT YOU FROM MEDICAL APPOINTMENTS, MEETINGS, WORK OR FROM GETTING THINGS NEEDED FOR DAILY LIVING?: NO

## 2023-03-03 SDOH — HEALTH STABILITY: MENTAL HEALTH
STRESS IS WHEN SOMEONE FEELS TENSE, NERVOUS, ANXIOUS, OR CAN'T SLEEP AT NIGHT BECAUSE THEIR MIND IS TROUBLED. HOW STRESSED ARE YOU?: NOT AT ALL

## 2023-03-03 SDOH — ECONOMIC STABILITY: FOOD INSECURITY: WITHIN THE PAST 12 MONTHS, YOU WORRIED THAT YOUR FOOD WOULD RUN OUT BEFORE YOU GOT MONEY TO BUY MORE.: SOMETIMES TRUE

## 2023-03-03 SDOH — ECONOMIC STABILITY: INCOME INSECURITY: HOW HARD IS IT FOR YOU TO PAY FOR THE VERY BASICS LIKE FOOD, HOUSING, MEDICAL CARE, AND HEATING?: HARD

## 2023-03-03 SDOH — ECONOMIC STABILITY: HOUSING INSECURITY
IN THE LAST 12 MONTHS, WAS THERE A TIME WHEN YOU DID NOT HAVE A STEADY PLACE TO SLEEP OR SLEPT IN A SHELTER (INCLUDING NOW)?: NO

## 2023-03-03 SDOH — HEALTH STABILITY: PHYSICAL HEALTH: ON AVERAGE, HOW MANY DAYS PER WEEK DO YOU ENGAGE IN MODERATE TO STRENUOUS EXERCISE (LIKE A BRISK WALK)?: 5 DAYS

## 2023-03-03 SDOH — ECONOMIC STABILITY: FOOD INSECURITY: WITHIN THE PAST 12 MONTHS, THE FOOD YOU BOUGHT JUST DIDN'T LAST AND YOU DIDN'T HAVE MONEY TO GET MORE.: NEVER TRUE

## 2023-03-03 SDOH — ECONOMIC STABILITY: HOUSING INSECURITY

## 2023-03-03 SDOH — ECONOMIC STABILITY: TRANSPORTATION INSECURITY
IN THE PAST 12 MONTHS, HAS THE LACK OF TRANSPORTATION KEPT YOU FROM MEDICAL APPOINTMENTS OR FROM GETTING MEDICATIONS?: NO

## 2023-03-03 SDOH — HEALTH STABILITY: PHYSICAL HEALTH: ON AVERAGE, HOW MANY MINUTES DO YOU ENGAGE IN EXERCISE AT THIS LEVEL?: 60 MIN

## 2023-03-03 SDOH — ECONOMIC STABILITY: INCOME INSECURITY: IN THE LAST 12 MONTHS, WAS THERE A TIME WHEN YOU WERE NOT ABLE TO PAY THE MORTGAGE OR RENT ON TIME?: NO

## 2023-03-03 ASSESSMENT — SOCIAL DETERMINANTS OF HEALTH (SDOH)
HOW MANY DRINKS CONTAINING ALCOHOL DO YOU HAVE ON A TYPICAL DAY WHEN YOU ARE DRINKING: PATIENT DOES NOT DRINK
HOW OFTEN DO YOU GET TOGETHER WITH FRIENDS OR RELATIVES?: TWICE A WEEK
IN A TYPICAL WEEK, HOW MANY TIMES DO YOU TALK ON THE PHONE WITH FAMILY, FRIENDS, OR NEIGHBORS?: THREE TIMES A WEEK
HOW OFTEN DO YOU ATTENT MEETINGS OF THE CLUB OR ORGANIZATION YOU BELONG TO?: NEVER
IN A TYPICAL WEEK, HOW MANY TIMES DO YOU TALK ON THE PHONE WITH FAMILY, FRIENDS, OR NEIGHBORS?: THREE TIMES A WEEK
DO YOU BELONG TO ANY CLUBS OR ORGANIZATIONS SUCH AS CHURCH GROUPS UNIONS, FRATERNAL OR ATHLETIC GROUPS, OR SCHOOL GROUPS?: NO
HOW HARD IS IT FOR YOU TO PAY FOR THE VERY BASICS LIKE FOOD, HOUSING, MEDICAL CARE, AND HEATING?: HARD
HOW OFTEN DO YOU ATTEND CHURCH OR RELIGIOUS SERVICES?: NEVER
HOW OFTEN DO YOU GET TOGETHER WITH FRIENDS OR RELATIVES?: TWICE A WEEK
HOW OFTEN DO YOU ATTEND CHURCH OR RELIGIOUS SERVICES?: NEVER
HOW OFTEN DO YOU HAVE SIX OR MORE DRINKS ON ONE OCCASION: NEVER
HOW OFTEN DO YOU ATTENT MEETINGS OF THE CLUB OR ORGANIZATION YOU BELONG TO?: NEVER
WITHIN THE PAST 12 MONTHS, YOU WORRIED THAT YOUR FOOD WOULD RUN OUT BEFORE YOU GOT THE MONEY TO BUY MORE: SOMETIMES TRUE
DO YOU BELONG TO ANY CLUBS OR ORGANIZATIONS SUCH AS CHURCH GROUPS UNIONS, FRATERNAL OR ATHLETIC GROUPS, OR SCHOOL GROUPS?: NO

## 2023-03-03 ASSESSMENT — LIFESTYLE VARIABLES
HOW OFTEN DO YOU HAVE SIX OR MORE DRINKS ON ONE OCCASION: NEVER
HOW MANY STANDARD DRINKS CONTAINING ALCOHOL DO YOU HAVE ON A TYPICAL DAY: PATIENT DOES NOT DRINK

## 2023-03-07 ENCOUNTER — OFFICE VISIT (OUTPATIENT)
Dept: MEDICAL GROUP | Facility: CLINIC | Age: 43
End: 2023-03-07
Payer: MEDICAID

## 2023-03-07 VITALS
SYSTOLIC BLOOD PRESSURE: 105 MMHG | DIASTOLIC BLOOD PRESSURE: 75 MMHG | RESPIRATION RATE: 16 BRPM | WEIGHT: 160 LBS | TEMPERATURE: 98.1 F | BODY MASS INDEX: 25.71 KG/M2 | OXYGEN SATURATION: 95 % | HEART RATE: 93 BPM | HEIGHT: 66 IN

## 2023-03-07 DIAGNOSIS — Z11.59 ENCOUNTER FOR HEPATITIS C SCREENING TEST FOR LOW RISK PATIENT: ICD-10-CM

## 2023-03-07 DIAGNOSIS — R21 FACIAL RASH: ICD-10-CM

## 2023-03-07 DIAGNOSIS — E28.319 EARLY MENOPAUSE: ICD-10-CM

## 2023-03-07 DIAGNOSIS — Z83.3 FAMILY HISTORY OF DIABETES MELLITUS: ICD-10-CM

## 2023-03-07 DIAGNOSIS — Z80.0 FAMILY HISTORY OF COLON CANCER: ICD-10-CM

## 2023-03-07 DIAGNOSIS — Z13.220 SCREENING FOR LIPID DISORDERS: ICD-10-CM

## 2023-03-07 PROBLEM — F41.9 ANXIETY DISORDER: Status: ACTIVE | Noted: 2023-03-07

## 2023-03-07 PROBLEM — K57.92 DIVERTICULITIS: Status: ACTIVE | Noted: 2023-03-07

## 2023-03-07 PROBLEM — K63.5 POLYP OF COLON: Status: ACTIVE | Noted: 2023-02-07

## 2023-03-07 PROBLEM — F32.9 MAJOR DEPRESSIVE DISORDER, SINGLE EPISODE, UNSPECIFIED: Status: ACTIVE | Noted: 2023-03-07

## 2023-03-07 PROCEDURE — 99204 OFFICE O/P NEW MOD 45 MIN: CPT | Mod: GC | Performed by: STUDENT IN AN ORGANIZED HEALTH CARE EDUCATION/TRAINING PROGRAM

## 2023-03-07 RX ORDER — FLUOXETINE 10 MG/1
10 CAPSULE ORAL
COMMUNITY
Start: 2023-02-15

## 2023-03-07 ASSESSMENT — FIBROSIS 4 INDEX: FIB4 SCORE: 0.39

## 2023-03-07 NOTE — PROGRESS NOTES
[facility] FAMILY MEDICINE OFFICE VISIT    Date: 3/7/2023    MRN: [id number]  Patient ID: Sharita Jordan    SUBJECTIVE:  Sharita Jordan is a 42 y.o. female here to establish care.  Patient reports she has not had a primary care doctor in some time, however does see several different specialists.  Sharita notes her primary concern for today is intermittent skin rash.  This affects her bilateral cheeks, areas around the eyes, and lower lip.  This tends to occur overnight, and is most noticeable in the morning.  Sometimes this is associated with significant swelling.  Patient notes that the rash is somewhat itchy.  Has seen dermatology previously, who prescribed a topical steroid which patient reports would help with the rash however because her skin to thin, resulting in significant irritation.  Patient denies any known or personal history of autoimmune diseases.  Has not noticed association with any new detergents, lotions, fabrics, or other changes recently.  Has not noticed any rash anywhere else.     Sharita also reports that she is undergoing evaluation by OB/GYN for early menopause.  This started within the past year.  Reports that her periods are intermittent, occurring as infrequently as once every 3 months.  Denies any possibility of pregnancy at this time.  Has not had any laboratory assessment for this issue as of yet.  Patient reports that she is also experiencing hot flashes.    With respect to general wellness, patient reports she has had an HIV test, however has never been tested for hepatitis C.  Has not been tested for cholesterol levels.  Does note a family history of diabetes in grandparents.  No family history of breast cancer, however does report family history of colon cancer in her father at age 50.  Recently underwent gastroenterology evaluation with colonoscopy for diverticulitis.  Was told to follow-up in 2026.    PMHx/PSHx:  Patient Active Problem List   Diagnosis    Anxiety disorder     "Diverticulitis    Major depressive disorder, single episode, unspecified    Polyp of colon     History reviewed. No pertinent surgical history.    Allergies: Cefaclor, Erythromycin, Erythromycin-sulfisoxazole, Azithromycin, and Cyclorex [cyclandelate]    Family history: Diabetes in grandparents.  Colon cancer in father (age 50).  No family history of breast cancer.    Social history: Previously , now .  No children.  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  Prior tobacco use, quit 1 month ago.  Prior alcohol abuse, in remission.  Denies other substance use.    OBJECTIVE:  Vitals:    03/07/23 0813   BP: 105/75   Pulse: 93   Resp: 16   Temp: 36.7 °C (98.1 °F)   SpO2: 95%     Vitals:    03/07/23 0813   BP: 105/75   Weight: 72.6 kg (160 lb)   Height: 1.676 m (5' 6\")       Physical Examination:  General: Well appearing female in no acute distress, resting on arrival to room  HEENT: Normocephalic, atraumatic, EOMI, nares patent, intact dentition  Cardiovascular: RRR, no murmurs, gallops, or rubs  Pulmonary: CTAB, symmetrical chest expansion, no rales, rhonchi, or wheezes  Abdominal: Non-tender to palpation, no guarding, rigidity, or distension  Extremities: Moves all spontaneously, no gross deformity or masses noted  Neurological: Alert and oriented  Skin: Pink, trace erythema of bilateral cheeks    ASSESSMENT & PLAN:  Sharita Jordan is a 42 y.o. female here to establish care, with concerns as addressed below.    1. Facial rash  JOCELINE W/REFLEX    ANTI-DNA (DS)    Oxymetazoline HCl 1 % Cream      2. Early menopause  TSH WITH REFLEX TO FT4    PROLACTIN    ESTROGENS FRACTIONATED    FSH      3. Screening for lipid disorders  Lipid Profile      4. Family history of diabetes mellitus  HEMOGLOBIN A1C    Referral to Genetic Research Studies      5. Encounter for hepatitis C screening test for low risk patient  HEP C VIRUS ANTIBODY      6. Family history of colon cancer  Referral to Genetic Research Studies    "       Orders Placed This Encounter    Lipid Profile    HEP C VIRUS ANTIBODY    HEMOGLOBIN A1C    TSH WITH REFLEX TO FT4    PROLACTIN    ESTROGENS FRACTIONATED    FSH    JOCELINE W/REFLEX    ANTI-DNA (DS)    Referral to Genetic Research Studies    FLUoxetine (PROZAC) 10 MG Cap    Oxymetazoline HCl 1 % Cream       #Facial rash  Patient with rash affecting cheeks, forehead, and lips of unknown etiology.  This is been a ongoing problem within the past few years.  Was value by dermatology, however unclear cause.  No family history of autoimmune disease.  Could consider rosacea versus lupus skin reaction.  At this time we will try to treat for rosacea with oxymetazoline 1% cream applied nightly.  Sent medication to pharmacy of choice.  Potential risks of medication discussed.  Physician has also ordered JOCELINE and anti-double-stranded DNA antibodies for lupus assessment.  Advised patient physician will contact her with all laboratory results within 1 week of having a test performed, and to contact the office if she does not hear back on a result during that time.  Advised patient that should testing and treatment for rosacea prove unfruitful, we will consider rereferral to new dermatologist as well as skin allergen testing.  Patient verbalized understanding.    #Early menopause  Patient with signs of early menopause including flushing and irregular menstrual periods.  Denies possibility of pregnancy.  At this time ordered TSH, prolactin, estrogen, and FSH levels.  Discussed necessity of timing to have this test adequately performed.  Advised patient to schedule follow-up 1 week after having test for review.  Patient verbalized understanding.    #Screen for lipid disorders  Ordered cholesterol level for patient.    #Family history of diabetes  Ordered hemoglobin A1c for patient as screening.    #Encounter for hepatitis C screening  Ordered once per lifetime hepatitis C antibody screening test.    #Family history of colon  cancer  Father diagnosed at age 50.  Patient has already undergone gastroenterology evaluation, due again in 2026.  Discussed possibility of and at this time have referred for [facility] for genetic risk assessment.  Referrals process discussed.    Iker Conn M.D.  Family Medicine Resident  PGY-4

## 2023-03-08 ENCOUNTER — RESEARCH ENCOUNTER (OUTPATIENT)
Dept: RESEARCH | Facility: WORKSITE | Age: 43
End: 2023-03-08
Attending: STUDENT IN AN ORGANIZED HEALTH CARE EDUCATION/TRAINING PROGRAM
Payer: MEDICAID

## 2023-03-08 DIAGNOSIS — Z00.6 RESEARCH STUDY PATIENT: ICD-10-CM

## 2023-03-08 NOTE — RESEARCH NOTE
[employer] [place] [employer] enrollment and kit pickup;  Tracking Number:   [id number]  [id number]

## 2023-03-29 ENCOUNTER — HOSPITAL ENCOUNTER (OUTPATIENT)
Dept: LAB | Facility: MEDICAL CENTER | Age: 43
End: 2023-03-29
Attending: OBSTETRICS & GYNECOLOGY
Payer: MEDICAID

## 2023-03-29 ENCOUNTER — HOSPITAL ENCOUNTER (OUTPATIENT)
Dept: LAB | Facility: MEDICAL CENTER | Age: 43
End: 2023-03-29
Attending: STUDENT IN AN ORGANIZED HEALTH CARE EDUCATION/TRAINING PROGRAM
Payer: MEDICAID

## 2023-03-29 DIAGNOSIS — Z83.3 FAMILY HISTORY OF DIABETES MELLITUS: ICD-10-CM

## 2023-03-29 DIAGNOSIS — E28.319 EARLY MENOPAUSE: ICD-10-CM

## 2023-03-29 DIAGNOSIS — Z13.220 SCREENING FOR LIPID DISORDERS: ICD-10-CM

## 2023-03-29 DIAGNOSIS — Z11.59 ENCOUNTER FOR HEPATITIS C SCREENING TEST FOR LOW RISK PATIENT: ICD-10-CM

## 2023-03-29 LAB
25(OH)D3 SERPL-MCNC: 34 NG/ML (ref 30–100)
ALBUMIN SERPL BCP-MCNC: 4.5 G/DL (ref 3.2–4.9)
ALBUMIN/GLOB SERPL: 1.6 G/DL
ALP SERPL-CCNC: 41 U/L (ref 30–99)
ALT SERPL-CCNC: 25 U/L (ref 2–50)
ANION GAP SERPL CALC-SCNC: 13 MMOL/L (ref 7–16)
AST SERPL-CCNC: 17 U/L (ref 12–45)
BASOPHILS # BLD AUTO: 1.4 % (ref 0–1.8)
BASOPHILS # BLD: 0.07 K/UL (ref 0–0.12)
BILIRUB SERPL-MCNC: 0.3 MG/DL (ref 0.1–1.5)
BUN SERPL-MCNC: 6 MG/DL (ref 8–22)
CALCIUM ALBUM COR SERPL-MCNC: 9.1 MG/DL (ref 8.5–10.5)
CALCIUM SERPL-MCNC: 9.5 MG/DL (ref 8.5–10.5)
CHLORIDE SERPL-SCNC: 103 MMOL/L (ref 96–112)
CHOLEST SERPL-MCNC: 222 MG/DL (ref 100–199)
CHOLEST SERPL-MCNC: 222 MG/DL (ref 100–199)
CO2 SERPL-SCNC: 23 MMOL/L (ref 20–33)
CORTIS SERPL-MCNC: 12.5 UG/DL (ref 0–23)
CREAT SERPL-MCNC: 0.71 MG/DL (ref 0.5–1.4)
CRP SERPL HS-MCNC: 0.2 MG/L (ref 0–3)
DHEA-S SERPL-MCNC: 73.1 UG/DL (ref 60.9–337)
EOSINOPHIL # BLD AUTO: 0.17 K/UL (ref 0–0.51)
EOSINOPHIL NFR BLD: 3.5 % (ref 0–6.9)
ERYTHROCYTE [DISTWIDTH] IN BLOOD BY AUTOMATED COUNT: 41.9 FL (ref 35.9–50)
ERYTHROCYTE [SEDIMENTATION RATE] IN BLOOD BY WESTERGREN METHOD: 10 MM/HOUR (ref 0–25)
EST. AVERAGE GLUCOSE BLD GHB EST-MCNC: 103 MG/DL
ESTRADIOL SERPL-MCNC: <5 PG/ML
FERRITIN SERPL-MCNC: 71.3 NG/ML (ref 10–291)
FIBRINOGEN PPP-MCNC: 316 MG/DL (ref 215–460)
FOLATE SERPL-MCNC: 31.2 NG/ML
FSH SERPL-ACNC: 71 MIU/ML
FSH SERPL-ACNC: 71.7 MIU/ML
GFR SERPLBLD CREATININE-BSD FMLA CKD-EPI: 109 ML/MIN/1.73 M 2
GLOBULIN SER CALC-MCNC: 2.9 G/DL (ref 1.9–3.5)
GLUCOSE SERPL-MCNC: 84 MG/DL (ref 65–99)
HBA1C MFR BLD: 5.2 % (ref 4–5.6)
HCT VFR BLD AUTO: 39.1 % (ref 37–47)
HCV AB SER QL: NORMAL
HCYS SERPL-SCNC: 8.05 UMOL/L
HDLC SERPL-MCNC: 63 MG/DL
HDLC SERPL-MCNC: 64 MG/DL
HGB BLD-MCNC: 13.2 G/DL (ref 12–16)
IMM GRANULOCYTES # BLD AUTO: 0.01 K/UL (ref 0–0.11)
IMM GRANULOCYTES NFR BLD AUTO: 0.2 % (ref 0–0.9)
IRON SATN MFR SERPL: 46 % (ref 15–55)
IRON SERPL-MCNC: 109 UG/DL (ref 40–170)
LDLC SERPL CALC-MCNC: 143 MG/DL
LDLC SERPL CALC-MCNC: 144 MG/DL
LH SERPL-ACNC: 39 IU/L
LYMPHOCYTES # BLD AUTO: 2.15 K/UL (ref 1–4.8)
LYMPHOCYTES NFR BLD: 44.4 % (ref 22–41)
MAGNESIUM SERPL-MCNC: 1.8 MG/DL (ref 1.5–2.5)
MCH RBC QN AUTO: 30.8 PG (ref 27–33)
MCHC RBC AUTO-ENTMCNC: 33.8 G/DL (ref 33.6–35)
MCV RBC AUTO: 91.1 FL (ref 81.4–97.8)
MONOCYTES # BLD AUTO: 0.36 K/UL (ref 0–0.85)
MONOCYTES NFR BLD AUTO: 7.4 % (ref 0–13.4)
NEUTROPHILS # BLD AUTO: 2.08 K/UL (ref 2–7.15)
NEUTROPHILS NFR BLD: 43.1 % (ref 44–72)
NRBC # BLD AUTO: 0 K/UL
NRBC BLD-RTO: 0 /100 WBC
PLATELET # BLD AUTO: 252 K/UL (ref 164–446)
PMV BLD AUTO: 10.8 FL (ref 9–12.9)
POTASSIUM SERPL-SCNC: 4.1 MMOL/L (ref 3.6–5.5)
PROGEST SERPL-MCNC: 0.11 NG/ML
PROLACTIN SERPL-MCNC: 13.5 NG/ML (ref 2.8–26)
PROLACTIN SERPL-MCNC: 13.6 NG/ML (ref 2.8–26)
PROT SERPL-MCNC: 7.4 G/DL (ref 6–8.2)
RBC # BLD AUTO: 4.29 M/UL (ref 4.2–5.4)
SODIUM SERPL-SCNC: 139 MMOL/L (ref 135–145)
T3 SERPL-MCNC: 106 NG/DL (ref 60–181)
T3FREE SERPL-MCNC: 3.13 PG/ML (ref 2–4.4)
T4 FREE SERPL-MCNC: 0.94 NG/DL (ref 0.93–1.7)
T4 SERPL-MCNC: 5.6 UG/DL (ref 4–12)
THYROPEROXIDASE AB SERPL-ACNC: <9 IU/ML (ref 0–9)
TIBC SERPL-MCNC: 235 UG/DL (ref 250–450)
TRIGL SERPL-MCNC: 73 MG/DL (ref 0–149)
TRIGL SERPL-MCNC: 74 MG/DL (ref 0–149)
TSH SERPL DL<=0.005 MIU/L-ACNC: 4.26 UIU/ML (ref 0.38–5.33)
TSH SERPL DL<=0.005 MIU/L-ACNC: 4.27 UIU/ML (ref 0.38–5.33)
UIBC SERPL-MCNC: 126 UG/DL (ref 110–370)
VIT B12 SERPL-MCNC: 842 PG/ML (ref 211–911)
WBC # BLD AUTO: 4.8 K/UL (ref 4.8–10.8)

## 2023-03-29 PROCEDURE — 83001 ASSAY OF GONADOTROPIN (FSH): CPT | Mod: 91

## 2023-03-29 PROCEDURE — 84146 ASSAY OF PROLACTIN: CPT | Mod: 91

## 2023-03-29 PROCEDURE — 82642 DIHYDROTESTOSTERONE: CPT

## 2023-03-29 PROCEDURE — 80061 LIPID PANEL: CPT | Mod: 91

## 2023-03-29 PROCEDURE — 83735 ASSAY OF MAGNESIUM: CPT

## 2023-03-29 PROCEDURE — 84144 ASSAY OF PROGESTERONE: CPT

## 2023-03-29 PROCEDURE — 86141 C-REACTIVE PROTEIN HS: CPT

## 2023-03-29 PROCEDURE — 84146 ASSAY OF PROLACTIN: CPT

## 2023-03-29 PROCEDURE — 86225 DNA ANTIBODY NATIVE: CPT

## 2023-03-29 PROCEDURE — 82533 TOTAL CORTISOL: CPT

## 2023-03-29 PROCEDURE — 82728 ASSAY OF FERRITIN: CPT

## 2023-03-29 PROCEDURE — 84305 ASSAY OF SOMATOMEDIN: CPT

## 2023-03-29 PROCEDURE — 83002 ASSAY OF GONADOTROPIN (LH): CPT

## 2023-03-29 PROCEDURE — 82626 DEHYDROEPIANDROSTERONE: CPT

## 2023-03-29 PROCEDURE — 84270 ASSAY OF SEX HORMONE GLOBUL: CPT

## 2023-03-29 PROCEDURE — 86364 TISS TRNSGLTMNASE EA IG CLAS: CPT

## 2023-03-29 PROCEDURE — 82670 ASSAY OF TOTAL ESTRADIOL: CPT

## 2023-03-29 PROCEDURE — 84140 ASSAY OF PREGNENOLONE: CPT

## 2023-03-29 PROCEDURE — 86376 MICROSOMAL ANTIBODY EACH: CPT

## 2023-03-29 PROCEDURE — 36415 COLL VENOUS BLD VENIPUNCTURE: CPT

## 2023-03-29 PROCEDURE — 85652 RBC SED RATE AUTOMATED: CPT

## 2023-03-29 PROCEDURE — 82746 ASSAY OF FOLIC ACID SERUM: CPT

## 2023-03-29 PROCEDURE — 84403 ASSAY OF TOTAL TESTOSTERONE: CPT

## 2023-03-29 PROCEDURE — 84443 ASSAY THYROID STIM HORMONE: CPT | Mod: 91

## 2023-03-29 PROCEDURE — 86803 HEPATITIS C AB TEST: CPT

## 2023-03-29 PROCEDURE — 86038 ANTINUCLEAR ANTIBODIES: CPT

## 2023-03-29 PROCEDURE — 83001 ASSAY OF GONADOTROPIN (FSH): CPT

## 2023-03-29 PROCEDURE — 82306 VITAMIN D 25 HYDROXY: CPT

## 2023-03-29 PROCEDURE — 83036 HEMOGLOBIN GLYCOSYLATED A1C: CPT | Mod: 91

## 2023-03-29 PROCEDURE — 84439 ASSAY OF FREE THYROXINE: CPT

## 2023-03-29 PROCEDURE — 84481 FREE ASSAY (FT-3): CPT

## 2023-03-29 PROCEDURE — 85384 FIBRINOGEN ACTIVITY: CPT

## 2023-03-29 PROCEDURE — 80053 COMPREHEN METABOLIC PANEL: CPT

## 2023-03-29 PROCEDURE — 84443 ASSAY THYROID STIM HORMONE: CPT

## 2023-03-29 PROCEDURE — 84480 ASSAY TRIIODOTHYRONINE (T3): CPT

## 2023-03-29 PROCEDURE — 82627 DEHYDROEPIANDROSTERONE: CPT

## 2023-03-29 PROCEDURE — 82671 ASSAY OF ESTROGENS: CPT

## 2023-03-29 PROCEDURE — 83525 ASSAY OF INSULIN: CPT

## 2023-03-29 PROCEDURE — 83550 IRON BINDING TEST: CPT

## 2023-03-29 PROCEDURE — 83540 ASSAY OF IRON: CPT

## 2023-03-29 PROCEDURE — 82784 ASSAY IGA/IGD/IGG/IGM EACH: CPT

## 2023-03-29 PROCEDURE — 83036 HEMOGLOBIN GLYCOSYLATED A1C: CPT

## 2023-03-29 PROCEDURE — 80061 LIPID PANEL: CPT

## 2023-03-29 PROCEDURE — 84482 T3 REVERSE: CPT

## 2023-03-29 PROCEDURE — 85025 COMPLETE CBC W/AUTO DIFF WBC: CPT

## 2023-03-29 PROCEDURE — 82607 VITAMIN B-12: CPT

## 2023-03-29 PROCEDURE — 83090 ASSAY OF HOMOCYSTEINE: CPT

## 2023-03-30 LAB
DSDNA AB TITR SER CLIF: NORMAL {TITER}
EST. AVERAGE GLUCOSE BLD GHB EST-MCNC: 103 MG/DL
HBA1C MFR BLD: 5.2 % (ref 4–5.6)
IGA SERPL-MCNC: 226 MG/DL (ref 68–408)
IGF-I SERPL-MCNC: 230 NG/ML (ref 75–267)
IGF-I Z-SCORE SERPL: 1.4
INSULIN P FAST SERPL-ACNC: 8 UIU/ML (ref 3–25)
SHBG SERPL-SCNC: 51 NMOL/L (ref 25–122)

## 2023-03-31 LAB
NUCLEAR IGG SER QL IA: NORMAL
TTG IGA SER IA-ACNC: 2 U/ML (ref 0–3)

## 2023-04-01 LAB
ANDROSTANOLONE SERPL-MCNC: 60.6 PG/ML (ref 24–208)
ESTRADIOL SERPL HS-MCNC: 5.7 PG/ML
ESTROGEN SERPL CALC-MCNC: 31.4 PG/ML
ESTRONE SERPL-MCNC: 25.7 PG/ML
PREG SERPL-MCNC: 62 NG/DL (ref 15–132)

## 2023-04-03 LAB
DHEA SERPL-MCNC: 2.98 NG/ML (ref 0.63–4.7)
T3REVERSE SERPL-MCNC: 7.8 NG/DL (ref 9–27)
TESTOST SERPL-MCNC: 25 NG/DL (ref 9–55)

## 2023-04-05 ENCOUNTER — OFFICE VISIT (OUTPATIENT)
Dept: MEDICAL GROUP | Facility: CLINIC | Age: 43
End: 2023-04-05
Payer: MEDICAID

## 2023-04-05 VITALS
HEART RATE: 83 BPM | BODY MASS INDEX: 25.39 KG/M2 | RESPIRATION RATE: 16 BRPM | WEIGHT: 158 LBS | OXYGEN SATURATION: 99 % | HEIGHT: 66 IN

## 2023-04-05 DIAGNOSIS — R21 FACIAL RASH: ICD-10-CM

## 2023-04-05 DIAGNOSIS — H92.01 RIGHT EAR PAIN: ICD-10-CM

## 2023-04-05 DIAGNOSIS — H93.8X1 SENSATION OF FULLNESS IN RIGHT EAR: ICD-10-CM

## 2023-04-05 DIAGNOSIS — E78.5 DYSLIPIDEMIA: ICD-10-CM

## 2023-04-05 DIAGNOSIS — E28.39 PRIMARY OVARIAN INSUFFICIENCY: ICD-10-CM

## 2023-04-05 LAB
APPEARANCE UR: CLEAR
BILIRUB UR STRIP-MCNC: NEGATIVE MG/DL
COLOR UR AUTO: YELLOW
GLUCOSE UR STRIP.AUTO-MCNC: ABNORMAL MG/DL
INT CON NEG: NEGATIVE
INT CON POS: POSITIVE
KETONES UR STRIP.AUTO-MCNC: NEGATIVE MG/DL
LEUKOCYTE ESTERASE UR QL STRIP.AUTO: ABNORMAL
NITRITE UR QL STRIP.AUTO: NEGATIVE
PH UR STRIP.AUTO: 8.5 [PH] (ref 5–8)
POC URINE PREGNANCY TEST: NEGATIVE
PROT UR QL STRIP: NEGATIVE MG/DL
RBC UR QL AUTO: NEGATIVE
SP GR UR STRIP.AUTO: 1.01
UROBILINOGEN UR STRIP-MCNC: 0.2 MG/DL

## 2023-04-05 PROCEDURE — 81002 URINALYSIS NONAUTO W/O SCOPE: CPT | Performed by: STUDENT IN AN ORGANIZED HEALTH CARE EDUCATION/TRAINING PROGRAM

## 2023-04-05 PROCEDURE — 81025 URINE PREGNANCY TEST: CPT | Performed by: STUDENT IN AN ORGANIZED HEALTH CARE EDUCATION/TRAINING PROGRAM

## 2023-04-05 PROCEDURE — 99214 OFFICE O/P EST MOD 30 MIN: CPT | Mod: GC | Performed by: STUDENT IN AN ORGANIZED HEALTH CARE EDUCATION/TRAINING PROGRAM

## 2023-04-05 RX ORDER — LORATADINE 10 MG/1
10 TABLET ORAL DAILY
COMMUNITY

## 2023-04-05 RX ORDER — PREDNISONE 10 MG/1
TABLET ORAL
Qty: 14 TABLET | Refills: 0 | Status: SHIPPED | OUTPATIENT
Start: 2023-04-05 | End: 2023-04-15

## 2023-04-05 ASSESSMENT — FIBROSIS 4 INDEX: FIB4 SCORE: 0.55

## 2023-04-05 NOTE — PROGRESS NOTES
"[facility] FAMILY MEDICINE OFFICE VISIT    Date: 4/5/2023    MRN: [id number]  Patient ID: Sharita Jordan    SUBJECTIVE:  Sharita Jordan is a 43 y.o. female here for follow-up.  Patient reports that since last visit she was unable to get the desired medication for treatment of her skin rashes covered by pharmacy.  Patient reports that she continues to get routine rashes around the orbits and lips.  Was told by dermatology here in town that she would ultimately require a referral to an allergist if this persisted, however no such referral has yet been placed.    With respect to infrequent menstrual periods, patient notes that she recently saw an APRN who ordered additional laboratory tests for this and that possible autoimmune disease.  Patient denies possibility of pregnancy.  She is interested in the possibility of having children later on in life.    Sharita does note that she has been experiencing right ear fullness associated with pain, tinnitus, and diminished hearing for approximately 1 month.  This began at the end of February after a COVID infection.  Has been taking ibuprofen to see if this would help, and already completed a course of antibiotics for ear infection without any improvement.  Does note that she has occasional bilateral tinnitus as well.    PMHx/PSHx:  History reviewed. No pertinent past medical history.  History reviewed. No pertinent surgical history.    Allergies: Cefaclor, Erythromycin, Erythromycin-sulfisoxazole, Azithromycin, and Cyclorex [cyclandelate]    OBJECTIVE:  Vitals:    04/05/23 0808   BP: (P) 102/66   Pulse: 83   Resp: 16   SpO2: 99%     Vitals:    04/05/23 0808   BP: (P) 102/66   Weight: 71.7 kg (158 lb)   Height: 1.676 m (5' 6\")       Physical Examination:  General: Well appearing female in no acute distress, resting on arrival to room  HEENT: Normocephalic, atraumatic, EOMI, nares patent with normal mucosa, clear bilateral tympanic membranes, nontender to palpation of external " ears, no TMJ crepitus with movement, intact dentition with normal gum mucosa, nontender to palpation over TMJ temporal regions  Cardiovascular: Skin pink, no acrocyanosis  Pulmonary: No tachypnea or retractions  Extremities: Moves all spontaneously  Neurological: Alert and oriented, negative Romberg test, normal finger-nose testing     ASSESSMENT & PLAN:  Sharita Jordan is a 43 y.o. female here for follow-up, with multiple medical concerns as addressed below.    1. Primary ovarian insufficiency  POC URINE PREGNANCY    Referral to Endocrinology      2. Facial rash  Referral to Allergy    POCT Urinalysis    Referral to Dermatology    CANCELED: Referral to Dermatology      3. Dyslipidemia        4. Sensation of fullness in right ear  Referral to ENT    predniSONE (DELTASONE) 10 MG Tab      5. Right ear pain  Referral to ENT    predniSONE (DELTASONE) 10 MG Tab          Orders Placed This Encounter    Referral to Allergy    Referral to Endocrinology    Referral to Dermatology    Referral to ENT    POCT Urinalysis    POC URINE PREGNANCY    loratadine (CLARITIN) 10 MG Tab    predniSONE (DELTASONE) 10 MG Tab       #Primary ovarian insufficiency  Physician reviewed previously ordered labs as well as those ordered by outside APRN, demonstrating elevated FSH, abnormal estrogen levels, and low progesterone potentially consistent with early menopausal symptoms.  Performed in-house point-of-care pregnancy test which was negative.  Patient still desires fertility treatments.  Does have a history of migraines, however this was 10 years ago since her last one.  Does not presently smoke.  Discussed with patient that typically bioidentical estrogen compounds could be considered for management of this issue, however with history of migraines, uncertain whether this is technically contraindicated due to increased risk for stroke.  At this time will refer patient to reproductive endocrinology to discuss this matter further, including  additional testing and treatment as warranted.  Referrals process discussed.    #Facial rash  Patient with persistent facial rash lasting for several years on a recurrent nature which is pruritic, of unknown etiology.  Reviewed all laboratory test which did not reveal any clear autoimmune side for ongoing rash.  Performed in-house urinalysis looking for proteinuria, which did not reveal such finding.  Discussed with patient that it is still uncertain why she continues to have persistent rash.  Topical steroids technically contraindicated over duration due to thinning of the skin, which patient reports then worsens the rash further.  At this time we will refer patient to both allergy and to dermatology for second opinion.  Referrals process discussed.  If either of these 2 are unable to come up with cause, might potentially consider referral to rheumatology for further testing at that time.  Discussed use of over-the-counter calamine lotion as needed for pruritus at nighttime, as well as regular lotion during the day to maintain moisture barrier.    #Sensation of fullness in right ear  #Right ear pain  Patient with both of these issues lasting for approximately 1 month after infection.  Most likely this represents simple postviral inflammatory state of the eustachian tube or cochlea, however cannot entirely exclude other causes.  At this time we will try treating with prednisone 20 mg oral daily for 5-day course followed by taper thereafter.  Medication sent to pharmacy of choice.  Physician has also referred to ear nose throat for evaluation including audiology testing.  Advised patient that should symptoms worsen acutely, tinnitus increase, or she begins to experience any sort of dizziness or coordination issues, she should notify physician immediately who will then place orders for MRI of the brain.  Patient verbalized understanding.    #Dyslipidemia  Discussed results of laboratory testing revealing elevated LDL  cholesterol levels.  Patient does report that she eats 2 eggs daily.  Advised that she discontinue this, and we will plan to recheck cholesterol levels again in 1 year.      Iker Conn M.D.  Family Medicine Resident  PGY-4

## 2023-04-10 LAB
APOB+LDLR+PCSK9 GENE MUT ANL BLD/T: NOT DETECTED
BRCA1+BRCA2 DEL+DUP + FULL MUT ANL BLD/T: NOT DETECTED
MLH1+MSH2+MSH6+PMS2 GN DEL+DUP+FUL M: NOT DETECTED

## 2023-04-17 DIAGNOSIS — R21 FACIAL RASH: ICD-10-CM

## 2023-07-21 ENCOUNTER — HOSPITAL ENCOUNTER (OUTPATIENT)
Dept: LAB | Facility: MEDICAL CENTER | Age: 43
End: 2023-07-21
Attending: OBSTETRICS & GYNECOLOGY
Payer: MEDICAID

## 2023-07-21 LAB
25(OH)D3 SERPL-MCNC: 51 NG/ML (ref 30–100)
ESTRADIOL SERPL-MCNC: 44.3 PG/ML
PROGEST SERPL-MCNC: 0.37 NG/ML
T3 SERPL-MCNC: 92 NG/DL (ref 60–181)
T3FREE SERPL-MCNC: 2.74 PG/ML (ref 2–4.4)
T4 FREE SERPL-MCNC: 1.05 NG/DL (ref 0.93–1.7)
T4 SERPL-MCNC: 5.8 UG/DL (ref 4–12)
TSH SERPL DL<=0.005 MIU/L-ACNC: 1.65 UIU/ML (ref 0.38–5.33)

## 2023-07-21 PROCEDURE — 84403 ASSAY OF TOTAL TESTOSTERONE: CPT

## 2023-07-21 PROCEDURE — 82306 VITAMIN D 25 HYDROXY: CPT

## 2023-07-21 PROCEDURE — 84480 ASSAY TRIIODOTHYRONINE (T3): CPT

## 2023-07-21 PROCEDURE — 84439 ASSAY OF FREE THYROXINE: CPT

## 2023-07-21 PROCEDURE — 84443 ASSAY THYROID STIM HORMONE: CPT

## 2023-07-21 PROCEDURE — 36415 COLL VENOUS BLD VENIPUNCTURE: CPT

## 2023-07-21 PROCEDURE — 84144 ASSAY OF PROGESTERONE: CPT

## 2023-07-21 PROCEDURE — 82670 ASSAY OF TOTAL ESTRADIOL: CPT

## 2023-07-21 PROCEDURE — 84481 FREE ASSAY (FT-3): CPT

## 2023-07-25 LAB — TESTOST SERPL-MCNC: 17 NG/DL (ref 9–55)

## 2023-11-28 ENCOUNTER — OFFICE VISIT (OUTPATIENT)
Dept: INTERNAL MEDICINE | Facility: OTHER | Age: 43
End: 2023-11-28
Payer: MEDICAID

## 2023-11-28 VITALS
WEIGHT: 160 LBS | OXYGEN SATURATION: 97 % | HEART RATE: 78 BPM | SYSTOLIC BLOOD PRESSURE: 136 MMHG | TEMPERATURE: 98.1 F | BODY MASS INDEX: 25.71 KG/M2 | DIASTOLIC BLOOD PRESSURE: 78 MMHG | HEIGHT: 66 IN

## 2023-11-28 DIAGNOSIS — L29.9 PRURITUS: ICD-10-CM

## 2023-11-28 DIAGNOSIS — L20.9 ATOPIC DERMATITIS, UNSPECIFIED TYPE: ICD-10-CM

## 2023-11-28 PROCEDURE — 99204 OFFICE O/P NEW MOD 45 MIN: CPT | Performed by: DERMATOLOGY

## 2023-11-28 PROCEDURE — 3078F DIAST BP <80 MM HG: CPT | Performed by: DERMATOLOGY

## 2023-11-28 PROCEDURE — 3075F SYST BP GE 130 - 139MM HG: CPT | Performed by: DERMATOLOGY

## 2023-11-28 RX ORDER — TESTOSTERONE GEL, 1% 10 MG/G
50 GEL TRANSDERMAL DAILY
COMMUNITY

## 2023-11-28 RX ORDER — PROGESTERONE 100 MG/1
200 CAPSULE ORAL DAILY
COMMUNITY

## 2023-11-28 RX ORDER — CETIRIZINE HYDROCHLORIDE 10 MG/1
10 TABLET ORAL DAILY
COMMUNITY

## 2023-11-28 RX ORDER — THYROID 30 MG/1
15 TABLET ORAL DAILY
COMMUNITY

## 2023-11-28 RX ORDER — TACROLIMUS 1 MG/G
OINTMENT TOPICAL
Qty: 60 G | Refills: 6 | Status: SHIPPED | OUTPATIENT
Start: 2023-11-28

## 2023-11-28 RX ORDER — LEVOTHYROXINE SODIUM 0.03 MG/1
25 TABLET ORAL
COMMUNITY

## 2023-11-28 RX ORDER — PIMECROLIMUS 10 MG/G
CREAM TOPICAL
Qty: 60 G | Refills: 5 | Status: SHIPPED | OUTPATIENT
Start: 2023-11-28

## 2023-11-28 ASSESSMENT — FIBROSIS 4 INDEX: FIB4 SCORE: 0.55

## 2023-11-28 NOTE — PATIENT INSTRUCTIONS
Read up on dupixent.     Sensitive skin     Warm showers  Mild bar soap  Less soap - private, stinky part  After shower, moisturize immediately  Cream after better than lotion    Can try tacrolimus ointment to itchy spots once a day  Try pimecrolimus cream to spots once a day.   No fabric softener  Mild laundry detergent. Rinse clothes twice

## 2023-11-28 NOTE — PROGRESS NOTES
"Sharita Jordan  1980  [id number]    Consultation             Vitals:    11/28/23 0908   BP: 136/78   BP Location: Left arm   Patient Position: Sitting   BP Cuff Size: Adult   Pulse: 78   Temp: 36.7 °C (98.1 °F)   TempSrc: Temporal   SpO2: 97%   Weight: 72.6 kg (160 lb)   Height: 1.676 m (5' 6\")       Chief Complaint   Patient presents with    Establish Care     Chronic facial rash that she can't control.    Bump     Left ear and right ankle      ___________________________________________________________________            History of Present Illness (HPI)    H/o rash on face since about 2019. Worse after Covid vaccine 2021. H/o seasonal allergies. Rash worse in winter.   Extreme itching and burning. Pt using physical blocker sunscreens. Pt has watched cosmetic products with no changes. Prior rx: hydrocortisone, triamcinolone, eucrisa, betamethasone. Insurance didn't cover tacrolimus. Oral steroids helped. Pt saw peak allergy - 4/2023. Pt was diagnosed with many allergies. Pt works outdoors.   Pt did patch testing to keep out propolis and fragrance.         Dr. Conn has referred for a consultation to evaluate facial rash     Current Outpatient Medications on File Prior to Visit   Medication Sig Dispense Refill    estrogens, conjugated (PREMARIN) 0.3 MG Tab Take 0.025 mg by mouth every day.      levothyroxine (SYNTHROID) 25 MCG Tab Take 25 mcg by mouth every morning on an empty stomach.      thyroid (ARMOUR THYROID) 30 MG Tab Take 15 mg by mouth every day.      progesterone (PROMETRIUM) 100 MG Cap Take 200 mg by mouth every day.      testosterone (TESTIM/ANDROGEL) 50 MG/5GM (1%) Gel gel Place 50 mg on the skin every day.      cetirizine (ZYRTEC) 10 MG Tab Take 10 mg by mouth every day.      loratadine (CLARITIN) 10 MG Tab Take 10 mg by mouth every day. (Patient not taking: Reported on 11/28/2023)      FLUoxetine (PROZAC) 10 MG Cap Take 10 mg by mouth at bedtime.      Oxymetazoline HCl 1 % Cream Apply " 1 Application topically every day. 30 g 1    buPROPion (WELLBUTRIN XL) 150 MG XL tablet       propranolol (INDERAL) 10 MG Tab  (Patient not taking: Reported on 12/16/2022)       No current facility-administered medications on file prior to visit.     Cefaclor, Erythromycin, Erythromycin-sulfisoxazole, Azithromycin, and Cyclorex [cyclandelate]      Review of Systems:        General: Denies fever      Hematologic: no excessive bleeding problems              History reviewed. No pertinent past medical history.  Skin Cancer no h/o    Social History     Tobacco Use    Smoking status: Every Day     Types: Cigarettes    Smokeless tobacco: Never   Vaping Use    Vaping Use: Never used   Substance Use Topics    Alcohol use: Not Currently     Comment: Social    Drug use: Never     Sunscreen Use:Yes    History reviewed. No pertinent surgical history.        History reviewed. No pertinent family history.      Physical Exam:   Constitutional: Well nourished, NAD, pleasant  alert and cooperative; normal mood and affect; normal attention span and concentration.    Skin face lips, periorbital pink eczematous rash   TBSA 2%                Assessment and Plan:   1. Atopic dermatitis, unspecified type  Explained pt probably has atopic dermatitis with h/o allergies.   Reviewed testing results from allergist -   Pt with patch test positive to fragrance and propolis  Pt has many seasonal allergies. She might not be a candidate for desentisization.   Consider dupixent. Pt to read about side effects - injection site reactions, rash, conjunctivitis.   Will try pimecrolimus and tacrolimus first.   - pimecrolimus (ELIDEL) 1 % cream; Apply to affected area once a day, topical anti inflammatory  Dispense: 60 g; Refill: 5  - tacrolimus (PROTOPIC) 0.1 % Ointment; Apply to affected area once a day, topical anti inflammatory  Dispense: 60 g; Refill: 6    2. Pruritus  Consider dupixent.   - pimecrolimus (ELIDEL) 1 % cream; Apply to affected area once  a day, topical anti inflammatory  Dispense: 60 g; Refill: 5  - tacrolimus (PROTOPIC) 0.1 % Ointment; Apply to affected area once a day, topical anti inflammatory  Dispense: 60 g; Refill: 6      Ness Rollins M.D.   Return in about 2 months (around 1/28/2024).

## 2024-01-05 ENCOUNTER — HOSPITAL ENCOUNTER (OUTPATIENT)
Dept: LAB | Facility: MEDICAL CENTER | Age: 44
End: 2024-01-05
Attending: OBSTETRICS & GYNECOLOGY
Payer: MEDICAID

## 2024-01-05 LAB
25(OH)D3 SERPL-MCNC: 47 NG/ML (ref 30–100)
ALBUMIN SERPL BCP-MCNC: 4.3 G/DL (ref 3.2–4.9)
ALBUMIN/GLOB SERPL: 1.4 G/DL
ALP SERPL-CCNC: 48 U/L (ref 30–99)
ALT SERPL-CCNC: 22 U/L (ref 2–50)
ANION GAP SERPL CALC-SCNC: 12 MMOL/L (ref 7–16)
AST SERPL-CCNC: 18 U/L (ref 12–45)
BASOPHILS # BLD AUTO: 1 % (ref 0–1.8)
BASOPHILS # BLD: 0.07 K/UL (ref 0–0.12)
BILIRUB SERPL-MCNC: 0.2 MG/DL (ref 0.1–1.5)
BUN SERPL-MCNC: 9 MG/DL (ref 8–22)
CALCIUM ALBUM COR SERPL-MCNC: 9 MG/DL (ref 8.5–10.5)
CALCIUM SERPL-MCNC: 9.2 MG/DL (ref 8.5–10.5)
CHLORIDE SERPL-SCNC: 107 MMOL/L (ref 96–112)
CO2 SERPL-SCNC: 24 MMOL/L (ref 20–33)
CREAT SERPL-MCNC: 0.6 MG/DL (ref 0.5–1.4)
DHEA-S SERPL-MCNC: 57 UG/DL (ref 60.9–337)
EOSINOPHIL # BLD AUTO: 0.14 K/UL (ref 0–0.51)
EOSINOPHIL NFR BLD: 2 % (ref 0–6.9)
ERYTHROCYTE [DISTWIDTH] IN BLOOD BY AUTOMATED COUNT: 41.1 FL (ref 35.9–50)
EST. AVERAGE GLUCOSE BLD GHB EST-MCNC: 114 MG/DL
ESTRADIOL SERPL-MCNC: 16 PG/ML
FERRITIN SERPL-MCNC: 54.3 NG/ML (ref 10–291)
FSH SERPL-ACNC: 48.9 MIU/ML
GFR SERPLBLD CREATININE-BSD FMLA CKD-EPI: 115 ML/MIN/1.73 M 2
GLOBULIN SER CALC-MCNC: 3.1 G/DL (ref 1.9–3.5)
GLUCOSE SERPL-MCNC: 87 MG/DL (ref 65–99)
HBA1C MFR BLD: 5.6 % (ref 4–5.6)
HCT VFR BLD AUTO: 43.8 % (ref 37–47)
HGB BLD-MCNC: 14.4 G/DL (ref 12–16)
IMM GRANULOCYTES # BLD AUTO: 0.01 K/UL (ref 0–0.11)
IMM GRANULOCYTES NFR BLD AUTO: 0.1 % (ref 0–0.9)
LH SERPL-ACNC: 34.9 IU/L
LYMPHOCYTES # BLD AUTO: 3.01 K/UL (ref 1–4.8)
LYMPHOCYTES NFR BLD: 43.1 % (ref 22–41)
MCH RBC QN AUTO: 30.3 PG (ref 27–33)
MCHC RBC AUTO-ENTMCNC: 32.9 G/DL (ref 32.2–35.5)
MCV RBC AUTO: 92 FL (ref 81.4–97.8)
MONOCYTES # BLD AUTO: 0.39 K/UL (ref 0–0.85)
MONOCYTES NFR BLD AUTO: 5.6 % (ref 0–13.4)
NEUTROPHILS # BLD AUTO: 3.37 K/UL (ref 1.82–7.42)
NEUTROPHILS NFR BLD: 48.2 % (ref 44–72)
NRBC # BLD AUTO: 0 K/UL
NRBC BLD-RTO: 0 /100 WBC (ref 0–0.2)
PLATELET # BLD AUTO: 268 K/UL (ref 164–446)
PMV BLD AUTO: 10 FL (ref 9–12.9)
POTASSIUM SERPL-SCNC: 4 MMOL/L (ref 3.6–5.5)
PROGEST SERPL-MCNC: 0.99 NG/ML
PROT SERPL-MCNC: 7.4 G/DL (ref 6–8.2)
RBC # BLD AUTO: 4.76 M/UL (ref 4.2–5.4)
SODIUM SERPL-SCNC: 143 MMOL/L (ref 135–145)
T3 SERPL-MCNC: 93.4 NG/DL (ref 60–181)
T3FREE SERPL-MCNC: 3.14 PG/ML (ref 2–4.4)
T4 FREE SERPL-MCNC: 1.32 NG/DL (ref 0.93–1.7)
T4 SERPL-MCNC: 7.1 UG/DL (ref 4–12)
TSH SERPL DL<=0.005 MIU/L-ACNC: 2.02 UIU/ML (ref 0.38–5.33)
WBC # BLD AUTO: 7 K/UL (ref 4.8–10.8)

## 2024-01-05 PROCEDURE — 82306 VITAMIN D 25 HYDROXY: CPT

## 2024-01-05 PROCEDURE — 84403 ASSAY OF TOTAL TESTOSTERONE: CPT

## 2024-01-05 PROCEDURE — 84481 FREE ASSAY (FT-3): CPT

## 2024-01-05 PROCEDURE — 80053 COMPREHEN METABOLIC PANEL: CPT

## 2024-01-05 PROCEDURE — 82670 ASSAY OF TOTAL ESTRADIOL: CPT

## 2024-01-05 PROCEDURE — 84439 ASSAY OF FREE THYROXINE: CPT

## 2024-01-05 PROCEDURE — 84270 ASSAY OF SEX HORMONE GLOBUL: CPT

## 2024-01-05 PROCEDURE — 83036 HEMOGLOBIN GLYCOSYLATED A1C: CPT

## 2024-01-05 PROCEDURE — 82627 DEHYDROEPIANDROSTERONE: CPT

## 2024-01-05 PROCEDURE — 36415 COLL VENOUS BLD VENIPUNCTURE: CPT

## 2024-01-05 PROCEDURE — 84144 ASSAY OF PROGESTERONE: CPT

## 2024-01-05 PROCEDURE — 83002 ASSAY OF GONADOTROPIN (LH): CPT

## 2024-01-05 PROCEDURE — 84443 ASSAY THYROID STIM HORMONE: CPT

## 2024-01-05 PROCEDURE — 84480 ASSAY TRIIODOTHYRONINE (T3): CPT

## 2024-01-05 PROCEDURE — 84402 ASSAY OF FREE TESTOSTERONE: CPT

## 2024-01-05 PROCEDURE — 85025 COMPLETE CBC W/AUTO DIFF WBC: CPT

## 2024-01-05 PROCEDURE — 83001 ASSAY OF GONADOTROPIN (FSH): CPT

## 2024-01-05 PROCEDURE — 82728 ASSAY OF FERRITIN: CPT

## 2024-01-11 LAB
SHBG SERPL-SCNC: 38 NMOL/L (ref 25–122)
TESTOST FREE SERPL-MCNC: 2.3 PG/ML (ref 1.1–5.8)
TESTOST SERPL-MCNC: 15 NG/DL (ref 9–55)

## 2024-02-13 ENCOUNTER — OFFICE VISIT (OUTPATIENT)
Dept: INTERNAL MEDICINE | Facility: OTHER | Age: 44
End: 2024-02-13
Payer: MEDICAID

## 2024-02-13 VITALS
WEIGHT: 155 LBS | OXYGEN SATURATION: 100 % | TEMPERATURE: 98.2 F | SYSTOLIC BLOOD PRESSURE: 120 MMHG | HEIGHT: 66 IN | DIASTOLIC BLOOD PRESSURE: 76 MMHG | BODY MASS INDEX: 24.91 KG/M2 | HEART RATE: 69 BPM

## 2024-02-13 DIAGNOSIS — L81.4 LENTIGO: ICD-10-CM

## 2024-02-13 DIAGNOSIS — D22.9 NEVUS: ICD-10-CM

## 2024-02-13 DIAGNOSIS — D23.9 DERMATOFIBROMA: ICD-10-CM

## 2024-02-13 DIAGNOSIS — L20.9 ATOPIC DERMATITIS, UNSPECIFIED TYPE: ICD-10-CM

## 2024-02-13 DIAGNOSIS — L29.9 PRURITUS: ICD-10-CM

## 2024-02-13 PROCEDURE — 3078F DIAST BP <80 MM HG: CPT | Performed by: DERMATOLOGY

## 2024-02-13 PROCEDURE — 99213 OFFICE O/P EST LOW 20 MIN: CPT | Performed by: DERMATOLOGY

## 2024-02-13 PROCEDURE — 3074F SYST BP LT 130 MM HG: CPT | Performed by: DERMATOLOGY

## 2024-02-13 ASSESSMENT — FIBROSIS 4 INDEX: FIB4 SCORE: 0.59

## 2024-02-13 NOTE — PROGRESS NOTES
"Sharita Jordan  1980  [id number]    Follow up             Vitals:    02/13/24 0839   BP: 120/76   BP Location: Left arm   Patient Position: Sitting   BP Cuff Size: Adult   Pulse: 69   Temp: 36.8 °C (98.2 °F)   TempSrc: Temporal   SpO2: 100%   Weight: 70.3 kg (155 lb)   Height: 1.676 m (5' 6\")       Chief Complaint   Patient presents with    Skin Lesion     Bump on right ankle and sunburn behind left ear     ___________________________________________________________________            History of Present Illness (HPI)    F/u rash on face since about 2019. Worse after Covid vaccine 2021. H/o seasonal allergies. Rash worse in winter.   Still extremely itching and burning. Pt using physical blocker sunscreens. Pt has watched cosmetic products with no changes. Prior rx: hydrocortisone, triamcinolone, eucrisa, betamethasone. Insurance didn't cover tacrolimus. Oral steroids helped. Pt saw peak allergy - 4/2023. Pt was diagnosed with many allergies. Pt works outdoors.   Pt did patch testing to keep out propolis and fragrance.       Pt didn't use pimecrolimus and tacrolimus because of side effects. Worse irritation. Pt not interested in dupixent due to concerns about side effects.     Pt trying low histamine diet and pt watching products for use on face. Pt seeing functional medicine doctor.       Check spot on R ankle x years. Occasionally tender. No prior rx.     Check spot behind L ear x a few years. Pt thinks due to sun burn. Can't see if changing.   Current Outpatient Medications on File Prior to Visit   Medication Sig Dispense Refill    estrogens, conjugated (PREMARIN) 0.3 MG Tab Take 0.025 mg by mouth every day.      levothyroxine (SYNTHROID) 25 MCG Tab Take 25 mcg by mouth every morning on an empty stomach.      thyroid (ARMOUR THYROID) 30 MG Tab Take 15 mg by mouth every day.      progesterone (PROMETRIUM) 100 MG Cap Take 200 mg by mouth every day.      testosterone (TESTIM/ANDROGEL) 50 MG/5GM (1%) " Gel gel Place 50 mg on the skin every day.      pimecrolimus (ELIDEL) 1 % cream Apply to affected area once a day, topical anti inflammatory 60 g 5    cetirizine (ZYRTEC) 10 MG Tab Take 10 mg by mouth every day. (Patient not taking: Reported on 2/13/2024)      tacrolimus (PROTOPIC) 0.1 % Ointment Apply to affected area once a day, topical anti inflammatory (Patient not taking: Reported on 2/13/2024) 60 g 6    loratadine (CLARITIN) 10 MG Tab Take 10 mg by mouth every day. (Patient not taking: Reported on 11/28/2023)      FLUoxetine (PROZAC) 10 MG Cap Take 10 mg by mouth at bedtime.      Oxymetazoline HCl 1 % Cream Apply 1 Application topically every day. 30 g 1    buPROPion (WELLBUTRIN XL) 150 MG XL tablet       propranolol (INDERAL) 10 MG Tab  (Patient not taking: Reported on 2/13/2024)       No current facility-administered medications on file prior to visit.     Cefaclor, Erythromycin, Erythromycin-sulfisoxazole, Azithromycin, and Cyclorex [cyclandelate]      Review of Systems:        General: Denies fever      Hematologic: no excessive bleeding problems              History reviewed. No pertinent past medical history.  Skin Cancer no h/o    Social History     Tobacco Use    Smoking status: Every Day     Types: Cigarettes    Smokeless tobacco: Never   Vaping Use    Vaping Use: Never used   Substance Use Topics    Alcohol use: Not Currently     Comment: Social    Drug use: Never     Sunscreen Use:Yes    History reviewed. No pertinent surgical history.        History reviewed. No pertinent family history.      Physical Exam:   Constitutional: Well nourished, NAD, pleasant  alert and cooperative; normal mood and affect; normal attention span and concentration.    Skin face lips, periorbital pink eczematous rash   TBSA 2%    L ear apex small brown flat mac  L ear superior lateral edge about 4 mm slightly raised flat topped slightly brown pap  R ankle lateral just above malleolus about 5 mm hyperpigmented firm dimpling  nodule          Assessment and Plan:   1. Dermatofibroma  Recommend follow for changes. ABCD's of changing skin lesions were reviewed, and the appropriate use of sunscreen was outlined and recommended.  Explained like a scar on R ankle. Biopsy would be risk of infection, ulcer not healing for months, another scar.   If changing, consider biopsy, but will follow for now.     2. Nevus  Recommend follow for changes. ABCD's of changing skin lesions were reviewed, and the appropriate use of sunscreen was outlined and recommended.   L ear probable nevus.     3. Lentigo  Recommend follow for changes. ABCD's of changing skin lesions were reviewed, and the appropriate use of sunscreen was outlined and recommended. L apex - probable after sunburn. Use hats and sunscreen.       4. Atopic dermatitis, unspecified type unchange   Explained pt probably has atopic dermatitis with h/o allergies.   Reviewed testing results from allergist -   Pt with patch test positive to fragrance and propolis  Pt has many seasonal allergies. She might not be a candidate for desentisization.   Pt was concerned about side effects from pimecrolimus and tacrolimus. Pt defers dupixent. Will continue functional medicine treatment to see if improves.       5. Pruritus unchanged  Pr defers dupixent. Will continue functional medicine treatment to see if improves.         Ness Rollins M.D.   Return if symptoms worsen or fail to improve.

## 2024-05-17 ENCOUNTER — OFFICE VISIT (OUTPATIENT)
Dept: MEDICAL GROUP | Facility: CLINIC | Age: 44
End: 2024-05-17
Payer: MEDICAID

## 2024-05-17 VITALS
TEMPERATURE: 98.3 F | HEART RATE: 60 BPM | DIASTOLIC BLOOD PRESSURE: 70 MMHG | BODY MASS INDEX: 26.03 KG/M2 | SYSTOLIC BLOOD PRESSURE: 96 MMHG | HEIGHT: 66 IN | OXYGEN SATURATION: 96 % | WEIGHT: 162 LBS

## 2024-05-17 DIAGNOSIS — H66.005 RECURRENT ACUTE SUPPURATIVE OTITIS MEDIA WITHOUT SPONTANEOUS RUPTURE OF LEFT TYMPANIC MEMBRANE: ICD-10-CM

## 2024-05-17 DIAGNOSIS — Z12.31 ENCOUNTER FOR SCREENING MAMMOGRAM FOR MALIGNANT NEOPLASM OF BREAST: ICD-10-CM

## 2024-05-17 DIAGNOSIS — J30.2 SEASONAL ALLERGIES: ICD-10-CM

## 2024-05-17 PROBLEM — F32.9 MAJOR DEPRESSIVE DISORDER, SINGLE EPISODE, UNSPECIFIED: Status: RESOLVED | Noted: 2023-03-07 | Resolved: 2024-05-17

## 2024-05-17 PROCEDURE — 3074F SYST BP LT 130 MM HG: CPT | Performed by: STUDENT IN AN ORGANIZED HEALTH CARE EDUCATION/TRAINING PROGRAM

## 2024-05-17 PROCEDURE — 99214 OFFICE O/P EST MOD 30 MIN: CPT | Performed by: STUDENT IN AN ORGANIZED HEALTH CARE EDUCATION/TRAINING PROGRAM

## 2024-05-17 PROCEDURE — 3078F DIAST BP <80 MM HG: CPT | Performed by: STUDENT IN AN ORGANIZED HEALTH CARE EDUCATION/TRAINING PROGRAM

## 2024-05-17 RX ORDER — ESTRADIOL 0.03 MG/D
1 FILM, EXTENDED RELEASE TRANSDERMAL
COMMUNITY
Start: 2023-08-24

## 2024-05-17 RX ORDER — PREDNISONE 5 MG/1
TABLET ORAL
Qty: 7 TABLET | Refills: 0 | Status: SHIPPED | OUTPATIENT
Start: 2024-05-17 | End: 2024-05-23

## 2024-05-17 RX ORDER — TESTOSTERONE 30 MG/1.5ML
SOLUTION TOPICAL
COMMUNITY
Start: 2023-08-24 | End: 2024-05-17

## 2024-05-17 RX ORDER — THYROID 15 MG/1
TABLET ORAL
COMMUNITY
Start: 2023-08-24

## 2024-05-17 RX ORDER — MONTELUKAST SODIUM 10 MG/1
10 TABLET ORAL
COMMUNITY
Start: 2024-03-19 | End: 2024-05-17

## 2024-05-17 RX ORDER — FLUTICASONE PROPIONATE 50 MCG
2 SPRAY, SUSPENSION (ML) NASAL DAILY
Qty: 16 G | Refills: 11 | Status: SHIPPED | OUTPATIENT
Start: 2024-05-17

## 2024-05-17 RX ORDER — PROGESTERONE 200 MG/1
CAPSULE ORAL
COMMUNITY
Start: 2023-08-24

## 2024-05-17 ASSESSMENT — FIBROSIS 4 INDEX: FIB4 SCORE: 0.6

## 2024-05-17 ASSESSMENT — PATIENT HEALTH QUESTIONNAIRE - PHQ9
4. FEELING TIRED OR HAVING LITTLE ENERGY: NOT AT ALL
8. MOVING OR SPEAKING SO SLOWLY THAT OTHER PEOPLE COULD HAVE NOTICED. OR THE OPPOSITE, BEING SO FIGETY OR RESTLESS THAT YOU HAVE BEEN MOVING AROUND A LOT MORE THAN USUAL: NOT AT ALL
6. FEELING BAD ABOUT YOURSELF - OR THAT YOU ARE A FAILURE OR HAVE LET YOURSELF OR YOUR FAMILY DOWN: NOT AL ALL
7. TROUBLE CONCENTRATING ON THINGS, SUCH AS READING THE NEWSPAPER OR WATCHING TELEVISION: NOT AT ALL
2. FEELING DOWN, DEPRESSED, IRRITABLE, OR HOPELESS: NOT AT ALL
1. LITTLE INTEREST OR PLEASURE IN DOING THINGS: NOT AT ALL
SUM OF ALL RESPONSES TO PHQ QUESTIONS 1-9: 0
SUM OF ALL RESPONSES TO PHQ9 QUESTIONS 1 AND 2: 0
9. THOUGHTS THAT YOU WOULD BE BETTER OFF DEAD, OR OF HURTING YOURSELF: NOT AT ALL
3. TROUBLE FALLING OR STAYING ASLEEP OR SLEEPING TOO MUCH: NOT AT ALL
5. POOR APPETITE OR OVEREATING: NOT AT ALL

## 2024-05-17 NOTE — PROGRESS NOTES
"[facility] OFFICE VISIT    Date: 5/17/2024    MRN: [id number]  Patient ID: Sharita Jordan    SUBJECTIVE:  Sharita Jordan is a 44 y.o. here for evaluation of left ear fullness and pressure.  Patient reports this started around 2 to 3 weeks ago.  Notes that seasonal allergies have been particular bothersome this year, presently using oral antihistamines daily.  Patient was seen at an urgent care center and prescribed 2 weeks of Flonase, but discontinued this according to the prescription recommendations.  Was only using 1 spray per nostril daily.  Patient is using a saline rinse nightly as well.  Patient denies any fevers, chills, overt pain in the left ear, sinus pressure, cough, or sore throat.    PMHx/PSHx:  Past Medical History:   Diagnosis Date    Major depressive disorder, single episode, unspecified 03/07/2023     Patient Active Problem List   Diagnosis    Anxiety disorder    Diverticulitis    Polyp of colon     No past surgical history on file.    Allergies: Cefaclor, Erythromycin, Erythromycin-sulfisoxazole, Azithromycin, and Gluten meal    OBJECTIVE:  Vitals:    05/17/24 0831   BP: 96/70   Pulse: 60   Temp: 36.8 °C (98.3 °F)   SpO2: 96%     Vitals:    05/17/24 0831   BP: 96/70   Weight: 73.5 kg (162 lb)   Height: 1.676 m (5' 6\")       Physical Examination:  General: Well appearing adult in no acute distress, resting on arrival to room  HEENT: Normocephalic, atraumatic, EOMI, nontender to palpation of maxillary and frontal sinus spaces, unremarkable right tympanic membrane, bulging left tympanic membrane without overt erythema, nontender to palpation bilateral ear canals, nares patent, no posterior oropharyngeal erythema  Cardiovascular: RRR, no murmurs, gallops, or rubs  Pulmonary: CTAB, symmetrical chest expansion, no rales, rhonchi, or wheezes  Extremities: Moves all spontaneously  Neurological: Alert and oriented    ASSESSMENT & PLAN:  Sharita Jordan is a 44 y.o. here for " evaluation of left ear fullness, with medical concerns as addressed below.    1. Recurrent acute suppurative otitis media without spontaneous rupture of left tympanic membrane  predniSONE (DELTASONE) 5 MG Tab    Referral to ENT      2. Seasonal allergies  fluticasone (FLONASE) 50 MCG/ACT nasal spray      3. Encounter for screening mammogram for malignant neoplasm of breast  MA-SCREENING MAMMO BILAT W/TOMOSYNTHESIS W/CAD          Orders Placed This Encounter    MA-SCREENING MAMMO BILAT W/TOMOSYNTHESIS W/CAD    Referral to ENT    DISCONTD: montelukast (SINGULAIR) 10 MG Tab    progesterone (PROMETRIUM) 200 MG capsule    DISCONTD: Testosterone 30 MG/ACT Solution    thyroid (ARMOUR THYROID) 15 MG Tab    Naltrexone HCl, Pain, 4.5 MG Cap    Estradiol 0.025 MG/24HR PATCH BIWEEKLY    predniSONE (DELTASONE) 5 MG Tab    fluticasone (FLONASE) 50 MCG/ACT nasal spray       # Recurrent acute suppurative otitis media  Patient with history of this issue 1 year ago, which may have also started during the allergy season.  Regrettably, did not follow-up with the ENT at that time due to a busy life schedule.  Discussed with patient that at this time left ear bulging is most consistent with above diagnosis, which does not appear to have any signs of overt infection.  Suspect inflammation only secondary to extent of seasonal allergies (see below).  At this time we will treat as we did 1 year ago with a short course of prednisone to decrease extent of inflammation and reduce swelling behind the eardrum.  Prescribed prednisone 10 mg oral daily for 2 days, followed by 5 mg oral daily for 2 days and then 2.5 mg oral for 2 days thereafter.  Medication sent to pharmacy of choice.  Have also referred patient to ENT given recurrence of this issue for discussion of whether to have tympanostomy tubes placed.  Referrals process discussed.    # Seasonal allergies  Discussed with patient that typically intranasal steroids are considered first-line and  are used on a daily basis to prevent inflammation related to allergies.  At this time have prescribed fluticasone 50 mcg per actuation 2 sprays per nostril once daily.  Medication sent to pharmacy of choice.    # Breast cancer screening  Discussed updated [place] preventative service task force guidelines recommending breast cancer screening beginning at the age of 40.  At this time have ordered screening mammogram for patient.  Imaging referrals process discussed.  Advised Sharita I will always contact my patients with their study results within a week of having a test performed, and to contact the office if they do not hear back on study results during that time.    Iker Conn M.D.

## 2024-06-05 ENCOUNTER — HOSPITAL ENCOUNTER (OUTPATIENT)
Dept: RADIOLOGY | Facility: MEDICAL CENTER | Age: 44
End: 2024-06-05
Attending: STUDENT IN AN ORGANIZED HEALTH CARE EDUCATION/TRAINING PROGRAM
Payer: MEDICAID

## 2024-06-05 DIAGNOSIS — Z12.31 ENCOUNTER FOR SCREENING MAMMOGRAM FOR MALIGNANT NEOPLASM OF BREAST: ICD-10-CM

## 2024-06-05 PROCEDURE — 77063 BREAST TOMOSYNTHESIS BI: CPT

## 2024-06-06 ENCOUNTER — PATIENT MESSAGE (OUTPATIENT)
Dept: MEDICAL GROUP | Facility: CLINIC | Age: 44
End: 2024-06-06
Payer: MEDICAID

## 2024-06-06 DIAGNOSIS — H66.005 RECURRENT ACUTE SUPPURATIVE OTITIS MEDIA WITHOUT SPONTANEOUS RUPTURE OF LEFT TYMPANIC MEMBRANE: ICD-10-CM

## 2024-07-05 ENCOUNTER — HOSPITAL ENCOUNTER (OUTPATIENT)
Dept: LAB | Facility: MEDICAL CENTER | Age: 44
End: 2024-07-05
Attending: OBSTETRICS & GYNECOLOGY
Payer: MEDICAID

## 2024-07-05 LAB
25(OH)D3 SERPL-MCNC: 55 NG/ML (ref 30–100)
ALBUMIN SERPL BCP-MCNC: 4.5 G/DL (ref 3.2–4.9)
ALBUMIN/GLOB SERPL: 1.6 G/DL
ALP SERPL-CCNC: 39 U/L (ref 30–99)
ALT SERPL-CCNC: 16 U/L (ref 2–50)
ANION GAP SERPL CALC-SCNC: 11 MMOL/L (ref 7–16)
AST SERPL-CCNC: 16 U/L (ref 12–45)
BASOPHILS # BLD AUTO: 1.1 % (ref 0–1.8)
BASOPHILS # BLD: 0.07 K/UL (ref 0–0.12)
BILIRUB SERPL-MCNC: 0.5 MG/DL (ref 0.1–1.5)
BUN SERPL-MCNC: 13 MG/DL (ref 8–22)
CALCIUM ALBUM COR SERPL-MCNC: 9.1 MG/DL (ref 8.5–10.5)
CALCIUM SERPL-MCNC: 9.5 MG/DL (ref 8.5–10.5)
CHLORIDE SERPL-SCNC: 104 MMOL/L (ref 96–112)
CHOLEST SERPL-MCNC: 233 MG/DL (ref 100–199)
CO2 SERPL-SCNC: 24 MMOL/L (ref 20–33)
CORTIS SERPL-MCNC: 15.2 UG/DL (ref 0–23)
CREAT SERPL-MCNC: 0.75 MG/DL (ref 0.5–1.4)
DHEA-S SERPL-MCNC: 197 UG/DL (ref 60.9–337)
EOSINOPHIL # BLD AUTO: 0.19 K/UL (ref 0–0.51)
EOSINOPHIL NFR BLD: 3.1 % (ref 0–6.9)
ERYTHROCYTE [DISTWIDTH] IN BLOOD BY AUTOMATED COUNT: 41.6 FL (ref 35.9–50)
EST. AVERAGE GLUCOSE BLD GHB EST-MCNC: 111 MG/DL
ESTRADIOL SERPL-MCNC: 23.9 PG/ML
FERRITIN SERPL-MCNC: 49.3 NG/ML (ref 10–291)
FOLATE SERPL-MCNC: 29.2 NG/ML
FSH SERPL-ACNC: 49.1 MIU/ML
GFR SERPLBLD CREATININE-BSD FMLA CKD-EPI: 102 ML/MIN/1.73 M 2
GLOBULIN SER CALC-MCNC: 2.9 G/DL (ref 1.9–3.5)
GLUCOSE SERPL-MCNC: 96 MG/DL (ref 65–99)
HBA1C MFR BLD: 5.5 % (ref 4–5.6)
HCT VFR BLD AUTO: 41.2 % (ref 37–47)
HDLC SERPL-MCNC: 73 MG/DL
HGB BLD-MCNC: 14.2 G/DL (ref 12–16)
IMM GRANULOCYTES # BLD AUTO: 0.01 K/UL (ref 0–0.11)
IMM GRANULOCYTES NFR BLD AUTO: 0.2 % (ref 0–0.9)
LDLC SERPL CALC-MCNC: 148 MG/DL
LH SERPL-ACNC: 34.5 IU/L
LYMPHOCYTES # BLD AUTO: 1.92 K/UL (ref 1–4.8)
LYMPHOCYTES NFR BLD: 31.1 % (ref 22–41)
MCH RBC QN AUTO: 31.8 PG (ref 27–33)
MCHC RBC AUTO-ENTMCNC: 34.5 G/DL (ref 32.2–35.5)
MCV RBC AUTO: 92.2 FL (ref 81.4–97.8)
MONOCYTES # BLD AUTO: 0.43 K/UL (ref 0–0.85)
MONOCYTES NFR BLD AUTO: 7 % (ref 0–13.4)
NEUTROPHILS # BLD AUTO: 3.56 K/UL (ref 1.82–7.42)
NEUTROPHILS NFR BLD: 57.5 % (ref 44–72)
NRBC # BLD AUTO: 0 K/UL
NRBC BLD-RTO: 0 /100 WBC (ref 0–0.2)
PLATELET # BLD AUTO: 242 K/UL (ref 164–446)
PMV BLD AUTO: 10.7 FL (ref 9–12.9)
POTASSIUM SERPL-SCNC: 4.5 MMOL/L (ref 3.6–5.5)
PROGEST SERPL-MCNC: 1.3 NG/ML
PROT SERPL-MCNC: 7.4 G/DL (ref 6–8.2)
RBC # BLD AUTO: 4.47 M/UL (ref 4.2–5.4)
SODIUM SERPL-SCNC: 139 MMOL/L (ref 135–145)
T3 SERPL-MCNC: 106 NG/DL (ref 60–181)
T3FREE SERPL-MCNC: 2.95 PG/ML (ref 2–4.4)
T4 FREE SERPL-MCNC: 1.11 NG/DL (ref 0.93–1.7)
T4 SERPL-MCNC: 6.2 UG/DL (ref 4–12)
TRIGL SERPL-MCNC: 58 MG/DL (ref 0–149)
TSH SERPL DL<=0.005 MIU/L-ACNC: 5.14 UIU/ML (ref 0.38–5.33)
VIT B12 SERPL-MCNC: 800 PG/ML (ref 211–911)
WBC # BLD AUTO: 6.2 K/UL (ref 4.8–10.8)

## 2024-07-05 PROCEDURE — 84480 ASSAY TRIIODOTHYRONINE (T3): CPT

## 2024-07-05 PROCEDURE — 84439 ASSAY OF FREE THYROXINE: CPT

## 2024-07-05 PROCEDURE — 84443 ASSAY THYROID STIM HORMONE: CPT

## 2024-07-05 PROCEDURE — 83036 HEMOGLOBIN GLYCOSYLATED A1C: CPT

## 2024-07-05 PROCEDURE — 82670 ASSAY OF TOTAL ESTRADIOL: CPT

## 2024-07-05 PROCEDURE — 80053 COMPREHEN METABOLIC PANEL: CPT

## 2024-07-05 PROCEDURE — 85025 COMPLETE CBC W/AUTO DIFF WBC: CPT

## 2024-07-05 PROCEDURE — 84270 ASSAY OF SEX HORMONE GLOBUL: CPT

## 2024-07-05 PROCEDURE — 82746 ASSAY OF FOLIC ACID SERUM: CPT

## 2024-07-05 PROCEDURE — 84403 ASSAY OF TOTAL TESTOSTERONE: CPT

## 2024-07-05 PROCEDURE — 82607 VITAMIN B-12: CPT

## 2024-07-05 PROCEDURE — 80061 LIPID PANEL: CPT

## 2024-07-05 PROCEDURE — 83088 ASSAY OF HISTAMINE: CPT

## 2024-07-05 PROCEDURE — 82728 ASSAY OF FERRITIN: CPT

## 2024-07-05 PROCEDURE — 82306 VITAMIN D 25 HYDROXY: CPT

## 2024-07-05 PROCEDURE — 82627 DEHYDROEPIANDROSTERONE: CPT

## 2024-07-05 PROCEDURE — 84144 ASSAY OF PROGESTERONE: CPT

## 2024-07-05 PROCEDURE — 84402 ASSAY OF FREE TESTOSTERONE: CPT

## 2024-07-05 PROCEDURE — 36415 COLL VENOUS BLD VENIPUNCTURE: CPT

## 2024-07-05 PROCEDURE — 82533 TOTAL CORTISOL: CPT

## 2024-07-05 PROCEDURE — 83001 ASSAY OF GONADOTROPIN (FSH): CPT

## 2024-07-05 PROCEDURE — 84481 FREE ASSAY (FT-3): CPT

## 2024-07-05 PROCEDURE — 83002 ASSAY OF GONADOTROPIN (LH): CPT

## 2024-07-08 LAB — HISTAMINE BLD-SCNC: 1371 NMOL/L (ref 180–1800)

## 2024-07-10 LAB
SHBG SERPL-SCNC: 43 NMOL/L (ref 25–122)
TESTOST FREE SERPL-MCNC: 3 PG/ML (ref 1.1–5.8)
TESTOST SERPL-MCNC: 21 NG/DL (ref 9–55)

## 2024-11-13 PROBLEM — M65.4 DE QUERVAIN'S TENOSYNOVITIS, RIGHT: Status: ACTIVE | Noted: 2024-11-13

## 2024-12-11 ENCOUNTER — APPOINTMENT (OUTPATIENT)
Dept: MEDICAL GROUP | Facility: CLINIC | Age: 44
End: 2024-12-11
Payer: MEDICAID

## 2024-12-11 ENCOUNTER — APPOINTMENT (OUTPATIENT)
Dept: ADMISSIONS | Facility: MEDICAL CENTER | Age: 44
End: 2024-12-11
Attending: ORTHOPAEDIC SURGERY
Payer: MEDICAID

## 2024-12-13 ENCOUNTER — PRE-ADMISSION TESTING (OUTPATIENT)
Dept: ADMISSIONS | Facility: MEDICAL CENTER | Age: 44
End: 2024-12-13
Attending: ORTHOPAEDIC SURGERY
Payer: MEDICAID

## 2024-12-13 VITALS — HEIGHT: 66 IN | WEIGHT: 157.19 LBS | BODY MASS INDEX: 25.26 KG/M2

## 2024-12-13 RX ORDER — NALTREXONE HYDROCHLORIDE 50 MG/1
6 TABLET, FILM COATED ORAL DAILY
COMMUNITY

## 2024-12-13 RX ORDER — ESTRADIOL 0.05 MG/D
1 PATCH, EXTENDED RELEASE TRANSDERMAL
COMMUNITY

## 2024-12-13 ASSESSMENT — FIBROSIS 4 INDEX: FIB4 SCORE: 0.69

## 2024-12-13 NOTE — PREADMIT AVS NOTE
Current Medications   Medication Instructions    estradiol (VIVELLE DOT) 0.05 MG/24HR PATCH BIWEEKLY Hold medication day of procedure    naltrexone (DEPADE) 50 MG Tab Follow instructions from Surgeon or Specialist     NON SPECIFIED Stop 7 days before surgery    progesterone (PROMETRIUM) 200 MG capsule Hold medication day of procedure    thyroid (ARMOUR THYROID) 15 MG Tab Continue taking medication as prescribed, including morning of procedure     levothyroxine (SYNTHROID) 25 MCG Tab Continue taking medication as prescribed, including morning of procedure     testosterone (TESTIM/ANDROGEL) 50 MG/5GM (1%) Gel gel Continue taking medication prior to surgery    cetirizine (ZYRTEC) 10 MG Tab Continue taking medication as prescribed, including morning of procedure

## 2024-12-13 NOTE — PREPROCEDURE INSTRUCTIONS
PreAdmit Appointment: Reviewed the Preparing for your procedure handout with patient. Patient instructed per pharmacy guidelines regarding taking, holding or contacting provider for instructions on regularly prescribed medications before surgery. Instructed to take the following medications the day of surgery with a sip of water per pharmacy medication guidelines: Zyrtec, Synthroid, [place] thyroid.    Patient instructed to check with surgeon and prescribing MD regarding Naltrexone.    Pt has not had anesthesia before. Pt reports no family history of adverse reaction to anesthesia.    Confirmed with patient where to check in morning of surgery. AVS/handouts given to patient & sent to patient's My Chart.

## 2024-12-30 ENCOUNTER — HOSPITAL ENCOUNTER (OUTPATIENT)
Dept: LAB | Facility: MEDICAL CENTER | Age: 44
End: 2024-12-30
Attending: OBSTETRICS & GYNECOLOGY
Payer: MEDICAID

## 2024-12-30 LAB
25(OH)D3 SERPL-MCNC: 98 NG/ML (ref 30–100)
ALBUMIN SERPL BCP-MCNC: 4.5 G/DL (ref 3.2–4.9)
ALBUMIN/GLOB SERPL: 1.6 G/DL
ALP SERPL-CCNC: 35 U/L (ref 30–99)
ALT SERPL-CCNC: 16 U/L (ref 2–50)
ANION GAP SERPL CALC-SCNC: 11 MMOL/L (ref 7–16)
AST SERPL-CCNC: 19 U/L (ref 12–45)
BASOPHILS # BLD AUTO: 0.7 % (ref 0–1.8)
BASOPHILS # BLD: 0.06 K/UL (ref 0–0.12)
BILIRUB SERPL-MCNC: 0.6 MG/DL (ref 0.1–1.5)
BUN SERPL-MCNC: 11 MG/DL (ref 8–22)
CALCIUM ALBUM COR SERPL-MCNC: 9.2 MG/DL (ref 8.5–10.5)
CALCIUM SERPL-MCNC: 9.6 MG/DL (ref 8.5–10.5)
CHLORIDE SERPL-SCNC: 103 MMOL/L (ref 96–112)
CO2 SERPL-SCNC: 24 MMOL/L (ref 20–33)
CORTIS SERPL-MCNC: 15.5 UG/DL (ref 0–23)
CREAT SERPL-MCNC: 0.74 MG/DL (ref 0.5–1.4)
DHEA-S SERPL-MCNC: 178 UG/DL (ref 60.9–337)
EOSINOPHIL # BLD AUTO: 0.2 K/UL (ref 0–0.51)
EOSINOPHIL NFR BLD: 2.5 % (ref 0–6.9)
ERYTHROCYTE [DISTWIDTH] IN BLOOD BY AUTOMATED COUNT: 40.1 FL (ref 35.9–50)
EST. AVERAGE GLUCOSE BLD GHB EST-MCNC: 114 MG/DL
ESTRADIOL SERPL-MCNC: 25.5 PG/ML
FSH SERPL-ACNC: 37.4 MIU/ML
GFR SERPLBLD CREATININE-BSD FMLA CKD-EPI: 103 ML/MIN/1.73 M 2
GLOBULIN SER CALC-MCNC: 2.8 G/DL (ref 1.9–3.5)
GLUCOSE SERPL-MCNC: 86 MG/DL (ref 65–99)
HBA1C MFR BLD: 5.6 % (ref 4–5.6)
HCT VFR BLD AUTO: 42.2 % (ref 37–47)
HGB BLD-MCNC: 14 G/DL (ref 12–16)
IMM GRANULOCYTES # BLD AUTO: 0.02 K/UL (ref 0–0.11)
IMM GRANULOCYTES NFR BLD AUTO: 0.2 % (ref 0–0.9)
LH SERPL-ACNC: 28.4 IU/L
LYMPHOCYTES # BLD AUTO: 2.35 K/UL (ref 1–4.8)
LYMPHOCYTES NFR BLD: 28.8 % (ref 22–41)
MCH RBC QN AUTO: 30.3 PG (ref 27–33)
MCHC RBC AUTO-ENTMCNC: 33.2 G/DL (ref 32.2–35.5)
MCV RBC AUTO: 91.3 FL (ref 81.4–97.8)
MONOCYTES # BLD AUTO: 0.52 K/UL (ref 0–0.85)
MONOCYTES NFR BLD AUTO: 6.4 % (ref 0–13.4)
NEUTROPHILS # BLD AUTO: 5 K/UL (ref 1.82–7.42)
NEUTROPHILS NFR BLD: 61.4 % (ref 44–72)
NRBC # BLD AUTO: 0 K/UL
NRBC BLD-RTO: 0 /100 WBC (ref 0–0.2)
PLATELET # BLD AUTO: 253 K/UL (ref 164–446)
PMV BLD AUTO: 10.5 FL (ref 9–12.9)
POTASSIUM SERPL-SCNC: 4 MMOL/L (ref 3.6–5.5)
PROGEST SERPL-MCNC: 1.22 NG/ML
PROT SERPL-MCNC: 7.3 G/DL (ref 6–8.2)
RBC # BLD AUTO: 4.62 M/UL (ref 4.2–5.4)
SODIUM SERPL-SCNC: 138 MMOL/L (ref 135–145)
T3 SERPL-MCNC: 86.5 NG/DL (ref 60–181)
T3FREE SERPL-MCNC: 3.01 PG/ML (ref 2–4.4)
T4 FREE SERPL-MCNC: 1.08 NG/DL (ref 0.93–1.7)
T4 SERPL-MCNC: 5.9 UG/DL (ref 4–12)
THYROPEROXIDASE AB SERPL-ACNC: 13 IU/ML (ref 0–9)
TSH SERPL-ACNC: 4.42 UIU/ML (ref 0.35–5.5)
WBC # BLD AUTO: 8.2 K/UL (ref 4.8–10.8)

## 2024-12-30 PROCEDURE — 84402 ASSAY OF FREE TESTOSTERONE: CPT

## 2024-12-30 PROCEDURE — 84270 ASSAY OF SEX HORMONE GLOBUL: CPT

## 2024-12-30 PROCEDURE — 84481 FREE ASSAY (FT-3): CPT

## 2024-12-30 PROCEDURE — 82533 TOTAL CORTISOL: CPT

## 2024-12-30 PROCEDURE — 84403 ASSAY OF TOTAL TESTOSTERONE: CPT

## 2024-12-30 PROCEDURE — 36415 COLL VENOUS BLD VENIPUNCTURE: CPT

## 2024-12-30 PROCEDURE — 84144 ASSAY OF PROGESTERONE: CPT

## 2024-12-30 PROCEDURE — 85025 COMPLETE CBC W/AUTO DIFF WBC: CPT

## 2024-12-30 PROCEDURE — 84480 ASSAY TRIIODOTHYRONINE (T3): CPT

## 2024-12-30 PROCEDURE — 84443 ASSAY THYROID STIM HORMONE: CPT

## 2024-12-30 PROCEDURE — 83036 HEMOGLOBIN GLYCOSYLATED A1C: CPT

## 2024-12-30 PROCEDURE — 83525 ASSAY OF INSULIN: CPT

## 2024-12-30 PROCEDURE — 83001 ASSAY OF GONADOTROPIN (FSH): CPT

## 2024-12-30 PROCEDURE — 84439 ASSAY OF FREE THYROXINE: CPT

## 2024-12-30 PROCEDURE — 82306 VITAMIN D 25 HYDROXY: CPT

## 2024-12-30 PROCEDURE — 83002 ASSAY OF GONADOTROPIN (LH): CPT

## 2024-12-30 PROCEDURE — 80053 COMPREHEN METABOLIC PANEL: CPT

## 2024-12-30 PROCEDURE — 82627 DEHYDROEPIANDROSTERONE: CPT

## 2024-12-30 PROCEDURE — 86376 MICROSOMAL ANTIBODY EACH: CPT

## 2024-12-30 PROCEDURE — 82670 ASSAY OF TOTAL ESTRADIOL: CPT

## 2025-01-01 LAB
INSULIN P FAST SERPL-ACNC: 7 UIU/ML (ref 3–25)
SHBG SERPL-SCNC: 53 NMOL/L (ref 25–122)
TESTOST FREE SERPL-MCNC: 13.9 PG/ML (ref 1.1–5.8)
TESTOST SERPL-MCNC: 109 NG/DL (ref 9–55)

## 2025-01-09 ENCOUNTER — ANESTHESIA EVENT (OUTPATIENT)
Dept: SURGERY | Facility: MEDICAL CENTER | Age: 45
End: 2025-01-09
Payer: MEDICAID

## 2025-01-10 ENCOUNTER — HOSPITAL ENCOUNTER (OUTPATIENT)
Facility: MEDICAL CENTER | Age: 45
End: 2025-01-10
Attending: ORTHOPAEDIC SURGERY | Admitting: ORTHOPAEDIC SURGERY
Payer: MEDICAID

## 2025-01-10 ENCOUNTER — ANESTHESIA (OUTPATIENT)
Dept: SURGERY | Facility: MEDICAL CENTER | Age: 45
End: 2025-01-10
Payer: MEDICAID

## 2025-01-10 VITALS
WEIGHT: 153.77 LBS | RESPIRATION RATE: 18 BRPM | OXYGEN SATURATION: 96 % | TEMPERATURE: 97.3 F | DIASTOLIC BLOOD PRESSURE: 59 MMHG | HEART RATE: 66 BPM | BODY MASS INDEX: 24.71 KG/M2 | HEIGHT: 66 IN | SYSTOLIC BLOOD PRESSURE: 118 MMHG

## 2025-01-10 PROCEDURE — 160035 HCHG PACU - 1ST 60 MINS PHASE I: Performed by: ORTHOPAEDIC SURGERY

## 2025-01-10 PROCEDURE — 700111 HCHG RX REV CODE 636 W/ 250 OVERRIDE (IP): Performed by: ORTHOPAEDIC SURGERY

## 2025-01-10 PROCEDURE — 160025 RECOVERY II MINUTES (STATS): Performed by: ORTHOPAEDIC SURGERY

## 2025-01-10 PROCEDURE — 160009 HCHG ANES TIME/MIN: Performed by: ORTHOPAEDIC SURGERY

## 2025-01-10 PROCEDURE — 160048 HCHG OR STATISTICAL LEVEL 1-5: Performed by: ORTHOPAEDIC SURGERY

## 2025-01-10 PROCEDURE — 160046 HCHG PACU - 1ST 60 MINS PHASE II: Performed by: ORTHOPAEDIC SURGERY

## 2025-01-10 PROCEDURE — 160002 HCHG RECOVERY MINUTES (STAT): Performed by: ORTHOPAEDIC SURGERY

## 2025-01-10 PROCEDURE — 25118 EXCISE WRIST TENDON SHEATH: CPT | Mod: RT | Performed by: ORTHOPAEDIC SURGERY

## 2025-01-10 PROCEDURE — 160028 HCHG SURGERY MINUTES - 1ST 30 MINS LEVEL 3: Performed by: ORTHOPAEDIC SURGERY

## 2025-01-10 PROCEDURE — 700111 HCHG RX REV CODE 636 W/ 250 OVERRIDE (IP): Mod: JZ | Performed by: ANESTHESIOLOGY

## 2025-01-10 RX ORDER — CEFAZOLIN SODIUM 1 G/3ML
INJECTION, POWDER, FOR SOLUTION INTRAMUSCULAR; INTRAVENOUS PRN
Status: DISCONTINUED | OUTPATIENT
Start: 2025-01-10 | End: 2025-01-10 | Stop reason: SURG

## 2025-01-10 RX ORDER — DIPHENHYDRAMINE HYDROCHLORIDE 50 MG/ML
12.5 INJECTION INTRAMUSCULAR; INTRAVENOUS
Status: DISCONTINUED | OUTPATIENT
Start: 2025-01-10 | End: 2025-01-10 | Stop reason: HOSPADM

## 2025-01-10 RX ORDER — BUPIVACAINE HYDROCHLORIDE 5 MG/ML
INJECTION, SOLUTION EPIDURAL; INTRACAUDAL
Status: DISCONTINUED | OUTPATIENT
Start: 2025-01-10 | End: 2025-01-10 | Stop reason: HOSPADM

## 2025-01-10 RX ORDER — SODIUM CHLORIDE, SODIUM LACTATE, POTASSIUM CHLORIDE, CALCIUM CHLORIDE 600; 310; 30; 20 MG/100ML; MG/100ML; MG/100ML; MG/100ML
INJECTION, SOLUTION INTRAVENOUS CONTINUOUS
Status: DISCONTINUED | OUTPATIENT
Start: 2025-01-10 | End: 2025-01-10 | Stop reason: HOSPADM

## 2025-01-10 RX ORDER — LIDOCAINE HYDROCHLORIDE 10 MG/ML
INJECTION, SOLUTION INFILTRATION; PERINEURAL
Status: DISCONTINUED | OUTPATIENT
Start: 2025-01-10 | End: 2025-01-10 | Stop reason: HOSPADM

## 2025-01-10 RX ORDER — MIDAZOLAM HYDROCHLORIDE 1 MG/ML
INJECTION INTRAMUSCULAR; INTRAVENOUS PRN
Status: DISCONTINUED | OUTPATIENT
Start: 2025-01-10 | End: 2025-01-10 | Stop reason: SURG

## 2025-01-10 RX ORDER — SODIUM CHLORIDE 9 MG/ML
INJECTION, SOLUTION INTRAVENOUS CONTINUOUS
Status: DISCONTINUED | OUTPATIENT
Start: 2025-01-10 | End: 2025-01-10 | Stop reason: HOSPADM

## 2025-01-10 RX ORDER — OXYCODONE HCL 5 MG/5 ML
10 SOLUTION, ORAL ORAL
Status: DISCONTINUED | OUTPATIENT
Start: 2025-01-10 | End: 2025-01-10 | Stop reason: HOSPADM

## 2025-01-10 RX ORDER — CEFAZOLIN SODIUM 1 G/3ML
2 INJECTION, POWDER, FOR SOLUTION INTRAMUSCULAR; INTRAVENOUS ONCE
Status: DISCONTINUED | OUTPATIENT
Start: 2025-01-10 | End: 2025-01-10 | Stop reason: HOSPADM

## 2025-01-10 RX ORDER — ONDANSETRON 2 MG/ML
4 INJECTION INTRAMUSCULAR; INTRAVENOUS
Status: DISCONTINUED | OUTPATIENT
Start: 2025-01-10 | End: 2025-01-10 | Stop reason: HOSPADM

## 2025-01-10 RX ORDER — OXYCODONE HCL 5 MG/5 ML
5 SOLUTION, ORAL ORAL
Status: DISCONTINUED | OUTPATIENT
Start: 2025-01-10 | End: 2025-01-10 | Stop reason: HOSPADM

## 2025-01-10 RX ADMIN — PROPOFOL 20 MG: 10 INJECTION, EMULSION INTRAVENOUS at 07:33

## 2025-01-10 RX ADMIN — PROPOFOL 20 MG: 10 INJECTION, EMULSION INTRAVENOUS at 07:41

## 2025-01-10 RX ADMIN — CEFAZOLIN 2 G: 1 INJECTION, POWDER, FOR SOLUTION INTRAMUSCULAR; INTRAVENOUS at 07:35

## 2025-01-10 RX ADMIN — PROPOFOL 20 MG: 10 INJECTION, EMULSION INTRAVENOUS at 07:46

## 2025-01-10 RX ADMIN — MIDAZOLAM HYDROCHLORIDE 2 MG: 1 INJECTION, SOLUTION INTRAMUSCULAR; INTRAVENOUS at 07:32

## 2025-01-10 ASSESSMENT — FIBROSIS 4 INDEX: FIB4 SCORE: 0.79

## 2025-01-10 ASSESSMENT — PAIN DESCRIPTION - PAIN TYPE: TYPE: SURGICAL PAIN

## 2025-01-10 ASSESSMENT — PAIN SCALES - GENERAL: PAIN_LEVEL: 0

## 2025-01-10 NOTE — OR NURSING
0828: Patient arrived to phase II from PACU 1 via gurney. Report received from RN. Respirations are spontaneous and unlabored. VSS on RA. Dressing is CDI. Pulse is 2+, cap refill less than 3 seconds, warm.    0835: Family at bedside.     0850: Patient education completed, family denies further questions. IV removed with tip intact. DC'd to care of family post uneventful stay in PACU 2.

## 2025-01-10 NOTE — ANESTHESIA POSTPROCEDURE EVALUATION
Patient: Sharita Jordan    Procedure Summary       Date: 01/10/25 Room / Location:  OR 02 / SURGERY [facility]    Anesthesia Start: 0730 Anesthesia Stop: 0753    Procedure: RIGHT HAND DEQUERVAINS RELEASE (Right: Hand) Diagnosis:       De Quervain's tenosynovitis, right      (De Quervain's tenosynovitis, right [M65.4])    Surgeons: Harleen Hampton M.D. Responsible Provider: Chai Toledo M.D.    Anesthesia Type: MAC ASA Status: 2            Final Anesthesia Type: MAC  Last vitals  BP   Blood Pressure: 110/83    Temp   36.6 °C (97.9 °F)    Pulse   67   Resp   14    SpO2   99 %      Anesthesia Post Evaluation    Patient location during evaluation: PACU  Patient participation: complete - patient participated  Level of consciousness: awake and alert  Pain score: 0    Airway patency: patent  Anesthetic complications: no  Cardiovascular status: hemodynamically stable  Respiratory status: acceptable  Hydration status: euvolemic    PONV: none          No notable events documented.     Nurse Pain Score: 0 (NPRS)

## 2025-01-10 NOTE — OR NURSING
PACU note- Respirations easy.  Ace wap clean and dry to right hand.  Fingers warm and mobile.  Right arm elevated.  Patient denies pain and nausea.  Report to Phase 2.Report to Phase 2.

## 2025-01-10 NOTE — OP REPORT
OPERATIVE NOTE     DATE OF PROCEDURE: 1/10/2025            PRE-OP DIAGNOSIS: right wrist first dorsal compartment tenosynovitis            POST-OP DIAGNOSIS: same            PROCEDURE: right wrist first dorsal compartment release, tenosynovectomy of the first dorsal compartment            SURGEON: Harleen Hampton M.D. - Primary            ANESTHESIA: MAC            ESTIMATED BLOOD LOSS: minimal                   SPECIMENS: none            COMPLICATIONS: none            CONDITION: stable to PACU    DESCRIPTION OF PROCEDURE: This is a pleasant 44 y.o. female who presented to my office with right wrist pain.  Diagnosis was consistent with first dorsal compartment tenosynovitis.  They had symptoms for a long time, and failed conservative treatment including splinting and cortisone injections.  We discussed further treatment options.  We discussed surgical first dorsal compartment release and tenosynovectomy.  We discussed the operative technique, risks, benefits, alternatives, as well as expectations postoperatively.  We discussed risks including, but not limited to, bleeding, infection, hematoma, seroma, swelling, numbness around the incision and the dorsum of the hand, continued pain, recurrence, scarring, tendon subluxation, need for further surgery.  They elected to proceed.  I saw the patient in the preoperative holding area, identified them, and marked the wrist.  They were taken back to the operating room.  Light sedation was induced by the anesthesia provider.  A timeout was performed.  I performed a local field block with 10 cc of a mixture of (50%) 1% lidocaine and (50%) 0.5% bupivacaine.  A tourniquet was placed on the forearm and the upper extremity was prepped and draped in usual orthopedic fashion.  An additional timeout was performed.  The tourniquet was inflated to 250 mmHg.  I made a 2.5 cm longitudinal incision over the first dorsal compartment.  I spread bluntly through the subcutaneous tissues,  identifying the dorsal sensory branch of the radial nerve, and gently retracting to protect it.  Identified the first dorsal compartment, which appeared thickened.  I incised the first dorsal compartment on its dorsal surface to prevent volar subluxation of the tendons.  I used a 15 blade as well as tenotomy scissors to entirely incised the first dorsal compartment both proximally and distally.  I identified extra slips of APL as well as an additional sub-compartment.  I made sure to release all the structures.  There was an extensive amount of tenosynovitis around the tendons in the first dorsal compartment.  I made an incision so that I could further expose the compartment and excised the tenosynovitis.  I spent an additional 10 minutes sharply debriding the tenosynovitis which complicated the case compared to a simple compartment release.  I then once again examined the wound and made sure that I had excised as much tenosynovitis as possible, that all of the tendons were fully released, and that there was no subluxation of the tendons with wrist motion.  The wound was then thoroughly irrigated with normal saline.  The skin was closed with 4-0 nylon suture.  The tourniquet was deflated.  A sterile dressing was applied.  They awoke from anesthesia and were transferred to the PACU in stable condition.

## 2025-01-10 NOTE — ANESTHESIA TIME REPORT
Anesthesia Start and Stop Event Times       Date Time Event    1/10/2025 0701 Ready for Procedure     0730 Anesthesia Start     0753 Anesthesia Stop          Responsible Staff  01/10/25      Name Role Begin End    Chai Toledo M.D. Anesth 0730 0752          Overtime Reason:  no overtime (within assigned shift)    Comments:

## 2025-01-10 NOTE — ANESTHESIA PREPROCEDURE EVALUATION
Case: [id number] Date/Time: 01/10/25 0720    Procedure: RIGHT HAND DEQUERVAINS RELEASE    Diagnosis: De Quervain's tenosynovitis, right [M65.4]    Pre-op diagnosis: De Quervain's tenosynovitis, right [M65.4]    Location:  OR  / SURGERY [facility]    Surgeons: Harleen Hampton M.D.            Relevant Problems   No relevant active problems       Physical Exam    Airway   Mallampati: II  TM distance: >3 FB  Neck ROM: full       Cardiovascular - normal exam  Rhythm: regular  Rate: normal  (-) murmur     Dental - normal exam           Pulmonary - normal exam  Breath sounds clear to auscultation     Abdominal    Neurological - normal exam                   Anesthesia Plan    ASA 2       Plan - MAC               Induction: intravenous    Postoperative Plan: Postoperative administration of opioids is intended.    Pertinent diagnostic labs and testing reviewed    Informed Consent:    Anesthetic plan and risks discussed with patient.    Use of blood products discussed with: patient whom consented to blood products.

## 2025-01-10 NOTE — LETTER
November 14, 2024    Patient Name: Sharita Jordan  Surgeon Name: Harleen Hampton M.D.  Surgery Facility: [facility] ([address] [place])  Surgery Date: 1/10/2025    The time of your surgery is not final and may change up to and until the day of your surgery. You will be contacted 24-48 hours prior to your surgery date with your check-in and surgery time.    If you will not be at one of the below numbers please call the surgery scheduler at [phone]  Preferred Phone: [phone]    BEFORE YOUR SURGERY   Pre Registration and/or Lab Work must be done within and no earlier than 28 days prior to your surgery date. Your scheduled facility will contact you regarding all required preregistration and/or lab work. If you have not been contacted within 7 days of your scheduled procedure please call [facility] at [phone] for an appointment as soon as possible.    Instructions: Bring a list of all medications you are taking including the dosing and frequency.    DAY OF YOUR SURGERY  Nothing to eat or drink after midnight     Refrain from smoking any substance after midnight prior to surgery. Smoking may interfere with the anesthetic and frequently produces nausea during the recovery period.    Continue taking all lifesaving medications. Including the morning of your surgery with small sip of water.    Please do NOT take on the day of surgery:  Diuretics: examples- furosemide (Lasix), spironolactone, hydrochlorothiazide  ACE-inhibitors: examples- lisinopril, ramipril, enalapril  “ARBs”: examples- losartan, Olmesartan, valsartan    Please arrive at the hospital/surgery center at the check-in time provided.     An adult will need to bring you and take you home after your surgery.     AFTER YOUR SURGERY  Post op Appointment:   Date: 1/20   Time: 10AM   With: Kika ALCOCER   Location: [address] [place] [address]    - Post Surgery - You will  need someone to drive you home  - You must have someone provide transportation post surgery and someone to monitor you for at least 24 hours post-surgery. If you don't have either of these your appointment will be canceled.     TIME OFF WORK  FMLA or Disability forms can be faxed directly to: [phone] or you may drop them off at [address] [place] [address]. Our office charges a $35.00 fee per form. Forms will be completed within 10 business days of drop off and payment received. For the status of your forms you may contact our disability office directly [phone].    MEDICATION INSTRUCTIONS **Please read section completely**  The following medications should be stopped a minimum of 10 days prior to surgery:  All over the counter, Supplements & Herbal medications    Anorectics: Phentermine (Adipex-P, Lomaira and Suprenza), Phentermine-topiramate (Qsymia), Bupropion-naltrexone (Contrave)    **If you are on Bupropion for anxiety/depression, please continue this medication up until the day of surgery.     Opiod Partial Agonists/Opioid Antagonists: Buprenorphine (Suboxone, Belbuca, Butrans, Probuphine Implant, Sublocade), Naltrexone (ReVia, Vivitrol), Naloxone    Amphetamines: Dextroamphetamine/Amphetamine (Adderall, Mydayis), Methylphenidate Hydrochloride (Concerta, Metadate, Methylin, Ritalin)    The following medications should be stopped 5 days prior to surgery:  Blood Thinners: Any Aspirin, Aspirin products, anti-inflammatories such as ibuprofen and any blood thinners such as Coumadin and Plavix. Please consult your prescribing physician if you are on life saving blood thinners, in regards to when to stop medications prior to surgery.     The following medications should be stopped a minimum of 3 days prior to surgery:  PDE-5 inhibitors: Sildenafil (Viagra), Tadalafil (Cialis), Vardenafil (Levitra), Avanafil (Stendra)    MAO Inhibitors: Rasagiline (Azilect), Selegiline (Eldepryl, Emsam,  Selapar), Isocarboxazid (Marplan), Phenelzine (Nardil)       COVID and INFLUENZA NOTICE TO PATIENTS    Currently, the [facility] does not routinely test patients for COVID-19 or Influenza prior to their elective surgery.  However, if patients develop the following symptoms prior to their surgery date, they should voluntarily test for COVID-19 and Influenza and notify the surgical office of their condition and results.  The symptoms warranting testing would be any two of the following:    Fever (Temp above 100.4 F)  Chills  Cough  Shortness of breath or difficulty breathing  Fatigue  Myalgias (muscle or body aches)  Headache  Sore Throat  Congestion or Runny Nose  Nausea or vomiting  Diarrhea  New loss of taste or smell    Having these symptoms prior to surgery can significantly increase your risk of morbidity and mortality under anesthesia, which may compromise your health and require a transfer to a hospital for a higher level of care.  Therefore, it is advisable to notify the surgical team of any illness in order to get information for the appropriate time to delay the surgery to minimize these preventable risks.    Your health and safety are our number one priority at the [facility], and we are thankful that you entrust us with your care.  Please help us ensure the best possible surgical and anesthetic outcome by sharing appropriate health information with our perioperative team and staff.  We look forward to taking great care of you!    Thank you for your time and consideration on this matter.    Sean Sanchez MD  Medical Director  Anesthesiologist  [facility]

## 2025-01-10 NOTE — DISCHARGE INSTRUCTIONS
HOME CARE INSTRUCTIONS    ACTIVITY: Rest and take it easy for the first 24 hours.  A responsible adult is recommended to remain with you during that time.  It is normal to feel sleepy.  We encourage you to not do anything that requires balance, judgment or coordination.    FOR 24 HOURS DO NOT:  Drive, operate machinery or run household appliances.  Drink beer or alcoholic beverages.  Make important decisions or sign legal documents.    SPECIAL INSTRUCTIONS: Keep dressing clean and dry.  May shower on Sunday after removing Ace wrap.  Place band-aid at incision.  Elevate right hand. Use ice pack, 20 mins on, 20 mins off.    DIET: To avoid nausea, slowly advance diet as tolerated, avoiding spicy or greasy foods for the first day.  Add more substantial food to your diet according to your physician's instructions.  Babies can be fed formula or breast milk as soon as they are hungry.  INCREASE FLUIDS AND FIBER TO AVOID CONSTIPATION.        MEDICATIONS: Resume taking daily medication.  Take prescribed pain medication with food.  If no medication is prescribed, you may take non-aspirin pain medication if needed.  PAIN MEDICATION CAN BE VERY CONSTIPATING.  Take a stool softener or laxative such as senokot, pericolace, or milk of magnesia if needed.    Prescription given for oxycodone. .   Follow up with Dr. Hampton as scheduled.     You should CALL YOUR PHYSICIAN if you develop:  Fever greater than 101 degrees F.  Pain not relieved by medication, or persistent nausea or vomiting.  Excessive bleeding (blood soaking through dressing) or unexpected drainage from the wound.  Extreme redness or swelling around the incision site, drainage of pus or foul smelling drainage.  Inability to urinate or empty your bladder within 8 hours.  Problems with breathing or chest pain.    You should call 911 if you develop problems with breathing or chest pain.  If you are unable to contact your doctor or surgical center, you should go to the  nearest emergency room or urgent care center.  Physician's telephone #: [phone].    MILD FLU-LIKE SYMPTOMS ARE NORMAL.  YOU MAY EXPERIENCE GENERALIZED MUSCLE ACHES, THROAT IRRITATION, HEADACHE AND/OR SOME NAUSEA.    If any questions arise, call your doctor.  If your doctor is not available, please feel free to call the Surgical Center at [phone].  The Center is open Monday through Friday from 7AM to 7PM.      A registered nurse may call you a few days after your surgery to see how you are doing after your procedure.    You may also receive a survey in the mail within the next two weeks and we ask that you take a few moments to complete the survey and return it to us.  Our goal is to provide you with very good care and we value your comments.     Depression / Suicide Risk    As you are discharged from this [facility] facility, it is important to learn how to keep safe from harming yourself.    Recognize the warning signs:  Abrupt changes in personality, positive or negative- including increase in energy   Giving away possessions  Change in eating patterns- significant weight changes-  positive or negative  Change in sleeping patterns- unable to sleep or sleeping all the time   Unwillingness or inability to communicate  Depression  Unusual sadness, discouragement and loneliness  Talk of wanting to die  Neglect of personal appearance   Rebelliousness- reckless behavior  Withdrawal from people/activities they love  Confusion- inability to concentrate     If you or a loved one observes any of these behaviors or has concerns about self-harm, here's what you can do:  Talk about it- your feelings and reasons for harming yourself  Remove any means that you might use to hurt yourself (examples: pills, rope, extension cords, firearm)  Get professional help from the community (Mental Health, Substance Abuse, psychological counseling)  Do not be alone:Call your Safe Contact- someone whom you trust who will be  there for you.  Call your local CRISIS HOTLINE 654-3202 or 953-502-3849  Call your local Children's Mobile Crisis Response Team [place] (088) 558-7936 or www.Tradeos  Call the toll free National Suicide Prevention Hotlines   National Suicide Prevention Lifeline 507-053-TIYF (4265)  [facility] Line Network 800-SUICIDE (700-2021)    I acknowledge receipt and understanding of these Home Care instructions.

## 2025-01-10 NOTE — H&P
Referring Provider: No ref. provider found  PCP: Pcp Pt States None  Reason for visit:       Chief Complaint   Patient presents with    Right Wrist - Follow-Up               History: Sharita Jordan is a pleasant 42 y.o. female coming in today for both of her wrists.  Really just the right side is bothering her now.  She is status post cortisone injection to bilateral first dorsal compartment.  She complains mostly of radial sided wrist pain.  The cortisone injection to her first dorsal compartment did work and completely relieved her symptoms.        PMH:   Past Medical History        Past Medical History:   Diagnosis Date    Major depressive disorder, single episode, unspecified 03/07/2023            PSH:   Past Surgical History   History reviewed. No pertinent surgical history.        Tobacco history:   Tobacco Use History   Social History           Tobacco Use   Smoking Status Every Day    Types: Cigarettes   Smokeless Tobacco Never            Medications:   Current Medications   Current Outpatient Medications:     progesterone (PROMETRIUM) 200 MG capsule, , Disp: , Rfl:     thyroid (ARMOUR THYROID) 15 MG Tab, , Disp: , Rfl:     Naltrexone HCl, Pain, 4.5 MG Cap, , Disp: , Rfl:     Estradiol 0.025 MG/24HR PATCH BIWEEKLY, 1 Patch., Disp: , Rfl:     fluticasone (FLONASE) 50 MCG/ACT nasal spray, Administer 2 Sprays into affected nostril(S) every day., Disp: 16 g, Rfl: 11    levothyroxine (SYNTHROID) 25 MCG Tab, Take 25 mcg by mouth every morning on an empty stomach., Disp: , Rfl:     testosterone (TESTIM/ANDROGEL) 50 MG/5GM (1%) Gel gel, Place 50 mg on the skin every day., Disp: , Rfl:     cetirizine (ZYRTEC) 10 MG Tab, Take 10 mg by mouth every day., Disp: , Rfl:         Allergies: Cefaclor, Erythromycin, Erythromycin-sulfisoxazole, Azithromycin, and Gluten meal     Exam: Bilateral wrist is atraumatic, skin intact.  On the right side, she is slightly tender over the first dorsal compartment, positive Finkelstein's  test.    Neurovascularly intact.  All tendons intact.     Imaging: please see separate imaging dictation.     Assessment/Plan: 42 y.o. female with chronic bilateral first dorsal compartment tenosynovitis.  Left side is stable.  Right side is symptomatic now.  She had excellent relief temporarily with a cortisone injection for about 6 months.  We discussed options.  She is ready to proceed with a right wrist first compartment release.  We discussed surgical technique, risks, benefits, alternatives, and expectations.        Harleen Hampton M.D

## 2025-01-10 NOTE — DISCHARGE INSTR - OTHER INFO
DR. HAMPTON'S POST-OPERATIVE INSTRUCTIONS    You have just undergone a surgery by Dr. Hampton in the operating room.  It is our wish that your postoperative recovery be as quick and comfortable as possible.  Please carefully review the following items that are important for your recovery.    After any operation, a certain degree of pain is to be expected. Take Advil (ibuprofen) and Extra Strength Tylenol as first line medications for mild to moderate pain. Taking each one every 6 hours, and staggering them so that you are taking one medicine every 3 hours, is the most effective. Refer to dosing instructions on the bottle, but in general ibuprofen dose is 600-800mg and Tylenol dose is 500-1000mg. For most small procedures, this should be enough to keep you comfortable. Take these medications until your followup visit. You may have been given a small prescription for stronger pain medicine which will help relieve more severe pain.  Pain medicine may make you drowsy so please keep this in mind.  Do not drive while taking pain medicine.      When you go home, please keep your operated arm elevated at all times (above the level of your heart).  If you do this, your swelling will resolve more quickly and your pain will be improve more quickly as well. You may also place an ice pack over your dressing or splint to help with swelling and pain.    It is also very important to keep your fingers moving after most procedures, unless you had a tendon repair or fracture repair in which case you will be in a splint. Keep all of your fingers moving through a full range of motion to prevent stiffness.    For small hand procedures such as carpal tunnel release, trigger finger release, and cyst excision, the dressing that you have on your extremity should remain on for 3-4 days. It may then be removed, you can wash the incision  gently with soap and water, and keep the incision covered with a band-aid or similar clean dressing.  For wrist ganglion cyst excision procedures, please leave the splint/dressing on for 5 days, then remove and replace with a Band-Aid.  At this point it is important to start working on wrist range of motion.  For larger procedures or if you have a splint on, these should remain on until follow up or as specifically instructed. If you feel that your dressing is too tight during the first 3 days after surgery, you may loosen it. It is normal to see minor staining on the dressing after surgery. If there is significant bleeding, you are advised to call the office during regular office hours to have this checked.  Make sure that your dressing is kept dry at all times.  You can take a shower if you cover your arm with a plastic bag. If your dressing gets wet, replace it with sterile dressing that you can obtain from your local drug store.    Follow up after surgery is typically 10-14 days, unless you were specifically instructed otherwise. This appointment is made at time of surgical scheduling. The sutures will be removed and you may be asked to see a hand therapist to optimize your functional result. Each of the hand therapists that you will be referred to have received special training in the care of the hand and upper extremity.    If you have questions regarding your surgery postop that you feel requires attention, please call the office at [phone] during business hours, or [phone] after hours for the answering service. If you feel that you have a surgical emergency postoperatively that requires immediate attention, please call the above numbers or go to the Emergency Department and ask for the Orthopedic Surgeon on call.

## 2025-01-15 ENCOUNTER — RESEARCH ENCOUNTER (OUTPATIENT)
Dept: RESEARCH | Facility: MEDICAL CENTER | Age: 45
End: 2025-01-15
Payer: MEDICAID

## 2025-08-04 ENCOUNTER — HOSPITAL ENCOUNTER (OUTPATIENT)
Facility: MEDICAL CENTER | Age: 45
End: 2025-08-04
Attending: OBSTETRICS & GYNECOLOGY
Payer: MEDICAID

## 2025-08-04 LAB
ALBUMIN SERPL BCP-MCNC: 4.4 G/DL (ref 3.2–4.9)
ALBUMIN/GLOB SERPL: 1.7 G/DL
ALP SERPL-CCNC: 30 U/L (ref 30–99)
ALT SERPL-CCNC: 26 U/L (ref 2–50)
ANION GAP SERPL CALC-SCNC: 11 MMOL/L (ref 7–16)
AST SERPL-CCNC: 20 U/L (ref 12–45)
BILIRUB SERPL-MCNC: 0.3 MG/DL (ref 0.1–1.5)
BUN SERPL-MCNC: 8 MG/DL (ref 8–22)
CALCIUM ALBUM COR SERPL-MCNC: 8.7 MG/DL (ref 8.5–10.5)
CALCIUM SERPL-MCNC: 9 MG/DL (ref 8.5–10.5)
CHLORIDE SERPL-SCNC: 105 MMOL/L (ref 96–112)
CO2 SERPL-SCNC: 20 MMOL/L (ref 20–33)
CORTIS SERPL-MCNC: 14.9 UG/DL (ref 0–23)
CREAT SERPL-MCNC: 0.65 MG/DL (ref 0.5–1.4)
CRP SERPL HS-MCNC: 0.5 MG/L (ref 0–3)
DHEA-S SERPL-MCNC: 174 UG/DL (ref 60.9–337)
EST. AVERAGE GLUCOSE BLD GHB EST-MCNC: 105 MG/DL
ESTRADIOL SERPL-MCNC: 60.1 PG/ML
FASTING STATUS PATIENT QL REPORTED: NORMAL
FSH SERPL-ACNC: 25.8 MIU/ML
GFR SERPLBLD CREATININE-BSD FMLA CKD-EPI: 112 ML/MIN/1.73 M 2
GLOBULIN SER CALC-MCNC: 2.6 G/DL (ref 1.9–3.5)
GLUCOSE SERPL-MCNC: 93 MG/DL (ref 65–99)
HBA1C MFR BLD: 5.3 % (ref 4–5.6)
LH SERPL-ACNC: 14.3 IU/L
POTASSIUM SERPL-SCNC: 4.5 MMOL/L (ref 3.6–5.5)
PROGEST SERPL-MCNC: 1.55 NG/ML
PROT SERPL-MCNC: 7 G/DL (ref 6–8.2)
SODIUM SERPL-SCNC: 136 MMOL/L (ref 135–145)
T3 SERPL-MCNC: 86.8 NG/DL (ref 60–181)
T3FREE SERPL-MCNC: 2.48 PG/ML (ref 2–4.4)
T4 FREE SERPL-MCNC: 1.14 NG/DL (ref 0.93–1.7)
T4 SERPL-MCNC: 6.1 UG/DL (ref 4–12)
THYROPEROXIDASE AB SERPL-ACNC: 28 IU/ML (ref 0–9)
TSH SERPL-ACNC: 1.86 UIU/ML (ref 0.38–5.33)

## 2025-08-04 PROCEDURE — 84443 ASSAY THYROID STIM HORMONE: CPT

## 2025-08-04 PROCEDURE — 83002 ASSAY OF GONADOTROPIN (LH): CPT

## 2025-08-04 PROCEDURE — 86141 C-REACTIVE PROTEIN HS: CPT

## 2025-08-04 PROCEDURE — 83001 ASSAY OF GONADOTROPIN (FSH): CPT

## 2025-08-04 PROCEDURE — 82670 ASSAY OF TOTAL ESTRADIOL: CPT

## 2025-08-04 PROCEDURE — 80053 COMPREHEN METABOLIC PANEL: CPT

## 2025-08-04 PROCEDURE — 84439 ASSAY OF FREE THYROXINE: CPT

## 2025-08-04 PROCEDURE — 83036 HEMOGLOBIN GLYCOSYLATED A1C: CPT

## 2025-08-04 PROCEDURE — 84402 ASSAY OF FREE TESTOSTERONE: CPT

## 2025-08-04 PROCEDURE — 84480 ASSAY TRIIODOTHYRONINE (T3): CPT

## 2025-08-04 PROCEDURE — 84403 ASSAY OF TOTAL TESTOSTERONE: CPT

## 2025-08-04 PROCEDURE — 36415 COLL VENOUS BLD VENIPUNCTURE: CPT

## 2025-08-04 PROCEDURE — 82642 DIHYDROTESTOSTERONE: CPT

## 2025-08-04 PROCEDURE — 84144 ASSAY OF PROGESTERONE: CPT

## 2025-08-04 PROCEDURE — 82627 DEHYDROEPIANDROSTERONE: CPT

## 2025-08-04 PROCEDURE — 82533 TOTAL CORTISOL: CPT

## 2025-08-04 PROCEDURE — 84270 ASSAY OF SEX HORMONE GLOBUL: CPT

## 2025-08-04 PROCEDURE — 86376 MICROSOMAL ANTIBODY EACH: CPT

## 2025-08-04 PROCEDURE — 84481 FREE ASSAY (FT-3): CPT

## 2025-08-05 ENCOUNTER — APPOINTMENT (OUTPATIENT)
Dept: RADIOLOGY | Facility: MEDICAL CENTER | Age: 45
End: 2025-08-05
Attending: STUDENT IN AN ORGANIZED HEALTH CARE EDUCATION/TRAINING PROGRAM
Payer: MEDICAID

## 2025-08-05 ASSESSMENT — FIBROSIS 4 INDEX: FIB4 SCORE: 0.67

## 2025-08-07 LAB — ANDROSTANOLONE SERPL-MCNC: 128 PG/ML (ref 24–208)

## 2025-08-10 LAB
SHBG SERPL-SCNC: 92 NMOL/L (ref 25–122)
TESTOST FREE SERPL-MCNC: 3.2 PG/ML (ref 1.1–5.8)
TESTOST SERPL-MCNC: 38 NG/DL (ref 9–55)

## (undated) DEVICE — SUTURE 4-0 ETHILON PS-2 18 (12PK/BX)"

## (undated) DEVICE — SUCTION INSTRUMENT YANKAUER BULBOUS TIP W/O VENT (50EA/CA)

## (undated) DEVICE — ELECTRODE DUAL RETURN W/ CORD - (50/PK)

## (undated) DEVICE — SENSOR OXIMETER ADULT SPO2 RD SET (20EA/BX)

## (undated) DEVICE — PACK UPPER EXTREMITY SM OR - (3/CA)

## (undated) DEVICE — BAG SPONGE COUNT 10.25 X 32 - BLUE (250/CA)

## (undated) DEVICE — GOWN WARMING STANDARD FLEX - (30/CA)

## (undated) DEVICE — WATER IRRIGATION STERILE 1000ML (12EA/CA)

## (undated) DEVICE — GLOVE BIOGEL INDICATOR SZ 8 SURGICAL PF LTX - (50/BX 4BX/CA)

## (undated) DEVICE — SODIUM CHL IRRIGATION 0.9% 1000ML (12EA/CA)

## (undated) DEVICE — TUBE CONNECTING SUCTION - CLEAR PLASTIC STERILE 72 IN (50EA/CA)

## (undated) DEVICE — CANISTER SUCTION RIGID RED 1500CC (40EA/CA)

## (undated) DEVICE — HUMID-VENT HEAT AND MOISTURE EXCHANGE- (50/BX)

## (undated) DEVICE — SET LEADWIRE 5 LEAD BEDSIDE DISPOSABLE ECG (1SET OF 5/EA)

## (undated) DEVICE — CHLORAPREP 26 ML APPLICATOR - ORANGE TINT(25/CA)

## (undated) DEVICE — TOWEL STOP TIMEOUT SAFETY FLAG (40EA/CA)

## (undated) DEVICE — BANDAGE ELASTIC LATEX STERILE VELCRO 4 X 5 YDS (25EA/CA)